# Patient Record
Sex: FEMALE | Race: WHITE | NOT HISPANIC OR LATINO
[De-identification: names, ages, dates, MRNs, and addresses within clinical notes are randomized per-mention and may not be internally consistent; named-entity substitution may affect disease eponyms.]

---

## 2018-08-08 ENCOUNTER — APPOINTMENT (OUTPATIENT)
Dept: VASCULAR SURGERY | Facility: CLINIC | Age: 63
End: 2018-08-08
Payer: MEDICARE

## 2018-08-08 ENCOUNTER — OUTPATIENT (OUTPATIENT)
Dept: OUTPATIENT SERVICES | Facility: HOSPITAL | Age: 63
LOS: 1 days | End: 2018-08-08
Payer: MEDICARE

## 2018-08-08 VITALS
HEART RATE: 75 BPM | SYSTOLIC BLOOD PRESSURE: 108 MMHG | BODY MASS INDEX: 20.58 KG/M2 | WEIGHT: 109 LBS | HEIGHT: 61 IN | DIASTOLIC BLOOD PRESSURE: 73 MMHG | OXYGEN SATURATION: 97 %

## 2018-08-08 DIAGNOSIS — R01.1 CARDIAC MURMUR, UNSPECIFIED: ICD-10-CM

## 2018-08-08 PROCEDURE — 93306 TTE W/DOPPLER COMPLETE: CPT

## 2018-08-08 PROCEDURE — 99204 OFFICE O/P NEW MOD 45 MIN: CPT

## 2018-08-08 PROCEDURE — 93306 TTE W/DOPPLER COMPLETE: CPT | Mod: 26

## 2020-11-29 ENCOUNTER — EMERGENCY (EMERGENCY)
Facility: HOSPITAL | Age: 65
LOS: 1 days | Discharge: HOME | End: 2020-11-29
Admitting: EMERGENCY MEDICINE
Payer: MEDICARE

## 2020-11-29 VITALS
DIASTOLIC BLOOD PRESSURE: 80 MMHG | HEART RATE: 64 BPM | OXYGEN SATURATION: 99 % | RESPIRATION RATE: 16 BRPM | SYSTOLIC BLOOD PRESSURE: 133 MMHG | TEMPERATURE: 98 F

## 2020-11-29 VITALS
HEIGHT: 61 IN | RESPIRATION RATE: 18 BRPM | SYSTOLIC BLOOD PRESSURE: 126 MMHG | HEART RATE: 102 BPM | WEIGHT: 106.04 LBS | OXYGEN SATURATION: 98 % | DIASTOLIC BLOOD PRESSURE: 87 MMHG | TEMPERATURE: 98 F

## 2020-11-29 DIAGNOSIS — M79.631 PAIN IN RIGHT FOREARM: ICD-10-CM

## 2020-11-29 DIAGNOSIS — M79.89 OTHER SPECIFIED SOFT TISSUE DISORDERS: ICD-10-CM

## 2020-11-29 LAB
ALBUMIN SERPL ELPH-MCNC: 4.4 G/DL — SIGNIFICANT CHANGE UP (ref 3.3–5)
ALP SERPL-CCNC: 55 U/L — SIGNIFICANT CHANGE UP (ref 40–120)
ALT FLD-CCNC: 12 U/L — SIGNIFICANT CHANGE UP (ref 10–45)
ANION GAP SERPL CALC-SCNC: 11 MMOL/L — SIGNIFICANT CHANGE UP (ref 5–17)
APTT BLD: 31.7 SEC — SIGNIFICANT CHANGE UP (ref 27.5–35.5)
AST SERPL-CCNC: 18 U/L — SIGNIFICANT CHANGE UP (ref 10–40)
BASOPHILS # BLD AUTO: 0.04 K/UL — SIGNIFICANT CHANGE UP (ref 0–0.2)
BASOPHILS NFR BLD AUTO: 0.7 % — SIGNIFICANT CHANGE UP (ref 0–2)
BILIRUB SERPL-MCNC: 0.5 MG/DL — SIGNIFICANT CHANGE UP (ref 0.2–1.2)
BUN SERPL-MCNC: 24 MG/DL — HIGH (ref 7–23)
CALCIUM SERPL-MCNC: 9.9 MG/DL — SIGNIFICANT CHANGE UP (ref 8.4–10.5)
CHLORIDE SERPL-SCNC: 104 MMOL/L — SIGNIFICANT CHANGE UP (ref 96–108)
CO2 SERPL-SCNC: 24 MMOL/L — SIGNIFICANT CHANGE UP (ref 22–31)
CREAT SERPL-MCNC: 0.78 MG/DL — SIGNIFICANT CHANGE UP (ref 0.5–1.3)
EOSINOPHIL # BLD AUTO: 0.1 K/UL — SIGNIFICANT CHANGE UP (ref 0–0.5)
EOSINOPHIL NFR BLD AUTO: 1.7 % — SIGNIFICANT CHANGE UP (ref 0–6)
GLUCOSE SERPL-MCNC: 104 MG/DL — HIGH (ref 70–99)
HCT VFR BLD CALC: 41.7 % — SIGNIFICANT CHANGE UP (ref 34.5–45)
HGB BLD-MCNC: 14.6 G/DL — SIGNIFICANT CHANGE UP (ref 11.5–15.5)
IMM GRANULOCYTES NFR BLD AUTO: 0.2 % — SIGNIFICANT CHANGE UP (ref 0–1.5)
INR BLD: 1.01 — SIGNIFICANT CHANGE UP (ref 0.88–1.16)
LYMPHOCYTES # BLD AUTO: 1.54 K/UL — SIGNIFICANT CHANGE UP (ref 1–3.3)
LYMPHOCYTES # BLD AUTO: 26.6 % — SIGNIFICANT CHANGE UP (ref 13–44)
MCHC RBC-ENTMCNC: 31.9 PG — SIGNIFICANT CHANGE UP (ref 27–34)
MCHC RBC-ENTMCNC: 35 GM/DL — SIGNIFICANT CHANGE UP (ref 32–36)
MCV RBC AUTO: 91 FL — SIGNIFICANT CHANGE UP (ref 80–100)
MONOCYTES # BLD AUTO: 0.47 K/UL — SIGNIFICANT CHANGE UP (ref 0–0.9)
MONOCYTES NFR BLD AUTO: 8.1 % — SIGNIFICANT CHANGE UP (ref 2–14)
NEUTROPHILS # BLD AUTO: 3.62 K/UL — SIGNIFICANT CHANGE UP (ref 1.8–7.4)
NEUTROPHILS NFR BLD AUTO: 62.7 % — SIGNIFICANT CHANGE UP (ref 43–77)
NRBC # BLD: 0 /100 WBCS — SIGNIFICANT CHANGE UP (ref 0–0)
PLATELET # BLD AUTO: 212 K/UL — SIGNIFICANT CHANGE UP (ref 150–400)
POTASSIUM SERPL-MCNC: 4.1 MMOL/L — SIGNIFICANT CHANGE UP (ref 3.5–5.3)
POTASSIUM SERPL-SCNC: 4.1 MMOL/L — SIGNIFICANT CHANGE UP (ref 3.5–5.3)
PROT SERPL-MCNC: 7.3 G/DL — SIGNIFICANT CHANGE UP (ref 6–8.3)
PROTHROM AB SERPL-ACNC: 12.1 SEC — SIGNIFICANT CHANGE UP (ref 10.6–13.6)
RBC # BLD: 4.58 M/UL — SIGNIFICANT CHANGE UP (ref 3.8–5.2)
RBC # FLD: 11.7 % — SIGNIFICANT CHANGE UP (ref 10.3–14.5)
SODIUM SERPL-SCNC: 139 MMOL/L — SIGNIFICANT CHANGE UP (ref 135–145)
WBC # BLD: 5.78 K/UL — SIGNIFICANT CHANGE UP (ref 3.8–10.5)
WBC # FLD AUTO: 5.78 K/UL — SIGNIFICANT CHANGE UP (ref 3.8–10.5)

## 2020-11-29 PROCEDURE — 85730 THROMBOPLASTIN TIME PARTIAL: CPT

## 2020-11-29 PROCEDURE — 36415 COLL VENOUS BLD VENIPUNCTURE: CPT

## 2020-11-29 PROCEDURE — 99284 EMERGENCY DEPT VISIT MOD MDM: CPT

## 2020-11-29 PROCEDURE — 80053 COMPREHEN METABOLIC PANEL: CPT

## 2020-11-29 PROCEDURE — 85025 COMPLETE CBC W/AUTO DIFF WBC: CPT

## 2020-11-29 PROCEDURE — 93971 EXTREMITY STUDY: CPT | Mod: 26,RT

## 2020-11-29 PROCEDURE — 99284 EMERGENCY DEPT VISIT MOD MDM: CPT | Mod: 25

## 2020-11-29 PROCEDURE — 93971 EXTREMITY STUDY: CPT

## 2020-11-29 PROCEDURE — 85610 PROTHROMBIN TIME: CPT

## 2020-11-29 RX ORDER — CEPHALEXIN 500 MG
500 CAPSULE ORAL ONCE
Refills: 0 | Status: COMPLETED | OUTPATIENT
Start: 2020-11-29 | End: 2020-11-29

## 2020-11-29 RX ADMIN — Medication 500 MILLIGRAM(S): at 15:26

## 2020-11-29 RX ADMIN — Medication 1 TABLET(S): at 15:26

## 2020-11-29 NOTE — ED ADULT NURSE NOTE - OBJECTIVE STATEMENT
64 y/o female c/o right arm, hand and wrist swelling and pain since last night. pt states " I don't have lymph nodes in my arm and I have a small paper cut, so I think I can have cellulitis" pt denies fall/ injuires, fever. able to move extremity without difficulty.

## 2020-11-29 NOTE — ED PROVIDER NOTE - CLINICAL SUMMARY MEDICAL DECISION MAKING FREE TEXT BOX
RUE swelling. neurovascular intact. a febrile. dvt vs lymph edema vs cellulitis. labs noted. us done and no dvt. will tx with keflex and bactrim and wound check in 2 days. return precautions d/w pt.

## 2020-11-29 NOTE — ED PROVIDER NOTE - PHYSICAL EXAMINATION
+ swelling to right forearm from elbow to hand. mild tendernss to ulnar aspect. no erythema. no bruising. no bony tenderness. FROM. distal NVI.

## 2020-11-29 NOTE — ED PROVIDER NOTE - OBJECTIVE STATEMENT
64 y/o female with hx of breast ca s/p masectomy and lymph node removal 1978 c/o right arm swelling x 1 day. pt notes swelling to right forearm past 24 hrs and mild pain to forearm this am. no trauma. no numbness, tingling or weakness. no fever or chills. pt denies recent compression to right arm. no further complaints. 64 y/o female with hx of breast ca s/p mastectomy and lymph node removal 1978 c/o right arm swelling x 1 day. pt notes swelling to right forearm past 24 hrs and mild pain to forearm this am. no trauma. no numbness, tingling or weakness. no fever or chills. pt denies recent compression to right arm. no further complaints.

## 2020-11-29 NOTE — ED PROVIDER NOTE - NSFOLLOWUPINSTRUCTIONS_ED_ALL_ED_FT
Follow up with your physician or the Emergency Department in 48 hours for wound check.                    Cellulitis    WHAT YOU NEED TO KNOW:    Cellulitis is a skin infection caused by bacteria. Cellulitis may go away on its own or you may need treatment. Your healthcare provider may draw a [place] around the outside edges of your cellulitis. If your cellulitis spreads, your healthcare provider will see it outside of the [place].     Cellulitis          DISCHARGE INSTRUCTIONS:    Call 911 if:   •You have sudden trouble breathing or chest pain.          Return to the emergency department if:   •Your wound gets larger and more painful.       •You feel a crackling under your skin when you touch it.      •You have purple dots or bumps on your skin, or you see bleeding under your skin.      •You have new swelling and pain in your legs.      •The red, warm, swollen area gets larger.      •You see red streaks coming from the infected area.      Contact your healthcare provider if:   •You have a fever.      •Your fever or pain does not go away or gets worse.      •The area does not get smaller after 2 days of antibiotics.      •Your skin is flaking or peeling off.      •You have questions or concerns about your condition or care.      Medicines:   •Antibiotics help treat the bacterial infection.       •NSAIDs, such as ibuprofen, help decrease swelling, pain, and fever. NSAIDs can cause stomach bleeding or kidney problems in certain people. If you take blood thinner medicine, always ask if NSAIDs are safe for you. Always read the medicine label and follow directions. Do not give these medicines to children under 6 months of age without direction from your child's healthcare provider.      •Acetaminophen decreases pain and fever. It is available without a doctor's order. Ask how much to take and how often to take it. Follow directions. Read the labels of all other medicines you are using to see if they also contain acetaminophen, or ask your doctor or pharmacist. Acetaminophen can cause liver damage if not taken correctly. Do not use more than 4 grams (4,000 milligrams) total of acetaminophen in one day.       •Take your medicine as directed. Contact your healthcare provider if you think your medicine is not helping or if you have side effects. Tell him or her if you are allergic to any medicine. Keep a list of the medicines, vitamins, and herbs you take. Include the amounts, and when and why you take them. Bring the list or the pill bottles to follow-up visits. Carry your medicine list with you in case of an emergency.      Self-care:   •Elevate the area above the level of your heart as often as you can. This will help decrease swelling and pain. Prop the area on pillows or blankets to keep it elevated comfortably.       •Clean the area daily until the wound scabs over. Gently wash the area with soap and water. Pat dry. Use dressings as directed.       •Place cool or warm, wet cloths on the area as directed. Use clean cloths and clean water. Leave it on the area until the cloth is room temperature. Pat the area dry with a clean, dry cloth. The cloths may help decrease pain.       Prevent cellulitis:   •Do not scratch bug bites or areas of injury. You increase your risk for cellulitis by scratching these areas.       •Do not share personal items, such as towels, clothing, and razors.       •Clean exercise equipment with germ-killing  before and after you use it.      •Wash your hands often. Use soap and water. Wash your hands after you use the bathroom, change a child's diapers, or sneeze. Wash your hands before you prepare or eat food. Use lotion to prevent dry, cracked skin.   Handwashing           •Wear pressure stockings as directed. You may be told to wear the stockings if you have peripheral edema. The stockings improve blood flow and decrease swelling.      •Treat athlete’s foot. This can help prevent the spread of a bacterial skin infection.      Follow up with your healthcare provider within 3 days, or as directed: Your healthcare provider will check if your cellulitis is getting better. You may need different medicine. Write down your questions so you remember to ask them during your visits.       © Copyright [employer] 2020           back to top                          © Copyright [employer] 2020

## 2020-11-30 RX ORDER — AZTREONAM 2 G
1 VIAL (EA) INJECTION
Qty: 20 | Refills: 0
Start: 2020-11-30 | End: 2020-12-09

## 2020-11-30 RX ORDER — CEPHALEXIN 500 MG
1 CAPSULE ORAL
Qty: 20 | Refills: 0
Start: 2020-11-30 | End: 2020-12-09

## 2020-12-11 PROBLEM — C50.919 MALIGNANT NEOPLASM OF UNSPECIFIED SITE OF UNSPECIFIED FEMALE BREAST: Chronic | Status: ACTIVE | Noted: 2020-11-29

## 2020-12-16 ENCOUNTER — APPOINTMENT (OUTPATIENT)
Dept: VASCULAR SURGERY | Facility: CLINIC | Age: 65
End: 2020-12-16
Payer: MEDICARE

## 2020-12-16 PROCEDURE — 99213 OFFICE O/P EST LOW 20 MIN: CPT

## 2020-12-23 NOTE — ADDENDUM
[FreeTextEntry1] : This note was written by Fadumo Romero on 12/16/2020 acting as scribe for Nehemias Avila M.D.\par \par I, Nehemias Mcdonald  have read and attest that all the information, medical decision making and discharge instructions within are true and accurate.

## 2020-12-23 NOTE — ASSESSMENT
[FreeTextEntry1] : 66 y/o F with hx of mastectomy and lymph node dissection who has had two episodes of cellulitis within the past several months and now has more persistent RUE swelling than in the past.  On exam, RUE: no palpable axillary masses, no tenderness, FROM, arm slightly swollen vs LUE. Skin intact, reviewed recent labs with no evidence of DVT.\par \par  Discussed with patient given her hx of RUE lymph node dissection,her residual R arm swelling may take up to 6 months to resolve and in some cases it may persist. She was advised on RUE compression sleeve to help reduce swelling and was reminded of the delicacy of sustaining any trauma/laceration to the RUE as she may quickly develop RUE cellulitis.  No vascular surgery intervention required for now. She will f/u with us here PRN. \par

## 2020-12-23 NOTE — PHYSICAL EXAM
[Normal Thyroid] : the thyroid was normal [Normal Breath Sounds] : Normal breath sounds [2+] : left 2+ [Ankle Swelling Bilaterally] : bilaterally  [No Rash or Lesion] : No rash or lesion [Alert] : alert [Oriented to Person] : oriented to person [Oriented to Place] : oriented to place [Oriented to Time] : oriented to time [Calm] : calm [Normal Heart Sounds] : normal heart sounds [Normal Rate and Rhythm] : normal rate and rhythm [JVD] : no jugular venous distention  [Carotid Bruits] : no carotid bruits [Ankle Swelling (On Exam)] : not present [Varicose Veins Of Lower Extremities] : not present [] : not present [Abdomen Tenderness] : ~T ~M No abdominal tenderness [Purpura] : no purpura  [Petechiae] : no petechiae [Skin Ulcer] : no ulcer [Skin Induration] : no induration [de-identified] : Well-appearing, NAD [de-identified] : NC/AT, anicteric [FreeTextEntry1] : RUE: no palpable axillary masses, no tenderness, FROM, arm slightly swollen vs LUE [de-identified] : FROM 5/5 x 4, no palpable cords in UEs

## 2020-12-23 NOTE — HISTORY OF PRESENT ILLNESS
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 Patient referred by Dr. Carmen Mcfarlane for evaluation. Patient reports several months ago she had  RUE cellulitis secondary to a small cut she sustained to her hand, she was seen by her PCP and was treated with Ciprofloxacin with resolution.  A few weeks ago, she was purchasing some items at [employer] and sustained a cut to her R hand with a box, she developed another RUE redness pain and swelling.She was seen at [facility] ER on 11/29 were she was found with RUE cellulitis, she had RUE venous US negative for DVT/SVT, subclavian DVT was discharged on Cipro and Bactrim. Her cellulitis resolved but notice RUE residual swelling. Furthermore, patient c/o mild RUE discomfort with repetitive arm movements most around her shoulder but denies any pain, limiting range of motion. \par

## 2021-08-30 ENCOUNTER — OUTPATIENT (OUTPATIENT)
Dept: OUTPATIENT SERVICES | Facility: HOSPITAL | Age: 66
LOS: 1 days | End: 2021-08-30
Payer: MEDICARE

## 2021-08-30 DIAGNOSIS — I05.1 RHEUMATIC MITRAL INSUFFICIENCY: ICD-10-CM

## 2021-08-30 PROCEDURE — 93306 TTE W/DOPPLER COMPLETE: CPT | Mod: 26

## 2021-08-30 PROCEDURE — 93306 TTE W/DOPPLER COMPLETE: CPT

## 2021-10-04 ENCOUNTER — NON-APPOINTMENT (OUTPATIENT)
Age: 66
End: 2021-10-04

## 2021-10-20 ENCOUNTER — APPOINTMENT (OUTPATIENT)
Dept: HEART AND VASCULAR | Facility: CLINIC | Age: 66
End: 2021-10-20
Payer: MEDICARE

## 2021-10-20 VITALS
HEART RATE: 88 BPM | BODY MASS INDEX: 20.01 KG/M2 | DIASTOLIC BLOOD PRESSURE: 70 MMHG | HEIGHT: 61 IN | WEIGHT: 106 LBS | SYSTOLIC BLOOD PRESSURE: 100 MMHG | OXYGEN SATURATION: 95 %

## 2021-10-20 DIAGNOSIS — Z80.52 FAMILY HISTORY OF MALIGNANT NEOPLASM OF BLADDER: ICD-10-CM

## 2021-10-20 DIAGNOSIS — Z82.49 FAMILY HISTORY OF ISCHEMIC HEART DISEASE AND OTHER DISEASES OF THE CIRCULATORY SYSTEM: ICD-10-CM

## 2021-10-20 PROCEDURE — 99203 OFFICE O/P NEW LOW 30 MIN: CPT

## 2021-10-26 PROBLEM — Z80.52 FAMILY HISTORY OF MALIGNANT NEOPLASM OF URINARY BLADDER: Status: ACTIVE | Noted: 2021-10-26

## 2021-10-26 PROBLEM — Z82.49 FAMILY HISTORY OF PREMATURE CAD: Status: ACTIVE | Noted: 2021-10-26

## 2021-10-26 NOTE — ASSESSMENT
[FreeTextEntry1] : Chronic MVP   with moderate to severe MR  and dilated left atrium\par \par hyperlipidemia \par \par family hx of AAA rupture

## 2021-10-26 NOTE — REVIEW OF SYSTEMS
[Weight Loss (___ Lbs)] : [unfilled] ~Ulb weight loss [Negative] : Heme/Lymph [FreeTextEntry1] : chronic mild right arm lymphedema

## 2021-10-26 NOTE — REASON FOR VISIT
[Symptom and Test Evaluation] : symptom and test evaluation [Structural Heart and Valve Disease] : structural heart and valve disease [FreeTextEntry1] : 66 year old female presents to establish care on advice of her friend who is also my patient .  Her previous cardiologist relocated.\par \par \par Hx of hyperlipidemia and remote hx of right sided breast cancer  treated with mastectomy at age 23 (no chemo or radiation received) \par

## 2021-10-26 NOTE — DISCUSSION/SUMMARY
[Moderate Mitral Regurgitation] : moderate mitral regurgitation [Severe Mitral Regurgitation] : severe mitral regurgitation [Compensated] : compensated [BNP] : B-type natriuretic peptide [Stress Echocardiogram] : stress echocardiogram [With Me] : with me [___ Month(s)] : in [unfilled] month(s) [FreeTextEntry1] : will set up stress echo to assess objective exercise capacity  r/o exertional arrhythmias or post exercise pulmonary hypertension \par \par upon return,  will screen   for AAA \par \par will discuss coronary calcium screening  in follow up \par \par reviewed outside echo studies and labs

## 2021-10-26 NOTE — HISTORY OF PRESENT ILLNESS
[FreeTextEntry1] : Long hx of MVP , she is an ex smoker . \par \par Has hyperlipidemia  which is being treated. \par \par No hx of HTN, DM, CHF , strokes or TIAs\par \par Denies palpitations\par \par Received HD fluzone  10 days ago\par \par Completed Moderna covid vaccine series \par \par Echocardiogram performed 8/30/21 showed severe mitral prolapse of PL  with moderate to severe MR  , moderately dilated left atrium  with normal LVEF  and no pulmonary hypertension \par \par Recent labs are normal except for   elevated LDL

## 2021-10-26 NOTE — PHYSICAL EXAM
[Well Developed] : well developed [Well Nourished] : well nourished [No Acute Distress] : no acute distress [Normal Conjunctiva] : normal conjunctiva [No Xanthelasma] : no xanthelasma [Normal Venous Pressure] : normal venous pressure [No Carotid Bruit] : no carotid bruit [Normal S1, S2] : normal S1, S2 [No Rub] : no rub [No Gallop] : no gallop [Clear Lung Fields] : clear lung fields [Good Air Entry] : good air entry [No Respiratory Distress] : no respiratory distress  [Soft] : abdomen soft [Non Tender] : non-tender [No Masses/organomegaly] : no masses/organomegaly [Normal Bowel Sounds] : normal bowel sounds [Normal Gait] : normal gait [Gait - Sufficient for Exercise Testing] : gait - sufficient for exercise testing [No Edema] : no edema [No Cyanosis] : no cyanosis [No Clubbing] : no clubbing [No Varicosities] : no varicosities [No Rash] : no rash [No Skin Lesions] : no skin lesions [Moves all extremities] : moves all extremities [No Focal Deficits] : no focal deficits [Normal Speech] : normal speech [Alert and Oriented] : alert and oriented [Normal memory] : normal memory [de-identified] : anicteric  [de-identified] : MV  click and  late systolic murmur  apex

## 2021-11-18 ENCOUNTER — APPOINTMENT (OUTPATIENT)
Dept: HEART AND VASCULAR | Facility: CLINIC | Age: 66
End: 2021-11-18
Payer: MEDICARE

## 2021-11-18 VITALS
WEIGHT: 106 LBS | SYSTOLIC BLOOD PRESSURE: 114 MMHG | TEMPERATURE: 98 F | DIASTOLIC BLOOD PRESSURE: 70 MMHG | HEART RATE: 81 BPM | BODY MASS INDEX: 20.01 KG/M2 | HEIGHT: 61 IN | OXYGEN SATURATION: 84 %

## 2021-11-18 PROCEDURE — 93978 VASCULAR STUDY: CPT

## 2021-11-18 PROCEDURE — 93015 CV STRESS TEST SUPVJ I&R: CPT

## 2022-02-02 ENCOUNTER — APPOINTMENT (OUTPATIENT)
Dept: VASCULAR SURGERY | Facility: CLINIC | Age: 67
End: 2022-02-02
Payer: MEDICARE

## 2022-02-02 ENCOUNTER — RESULT CHARGE (OUTPATIENT)
Age: 67
End: 2022-02-02

## 2022-02-02 VITALS
SYSTOLIC BLOOD PRESSURE: 105 MMHG | WEIGHT: 106 LBS | DIASTOLIC BLOOD PRESSURE: 68 MMHG | HEART RATE: 72 BPM | HEIGHT: 61 IN | BODY MASS INDEX: 20.01 KG/M2

## 2022-02-02 PROCEDURE — 99213 OFFICE O/P EST LOW 20 MIN: CPT

## 2022-02-03 NOTE — ADDENDUM
[FreeTextEntry1] : I, Dr. Nehemias Greene, personally performed the evaluation and management (E/M) services for this established patient who presents today with (a) new problem(s)/exacerbation of (an) existing condition(s).  That E/M includes conducting the examination, assessing all new/exacerbated conditions, and establishing a new plan of care.  Today, my ACP, Suly Chapman NP, was here to observe my evaluation and management services for this new problem/exacerbated condition to be followed going forward.

## 2022-02-03 NOTE — ASSESSMENT
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  On exam, bulging, palpable, nontender, soft aneurysmal artery in the left temporal area. Discussed w/ pt options to not proceed with any surgical interventions and possibility to ligate the vessel as well. Risk, complications and alternatives were discussed, all questions were answered. Patient would like to proceed with surgery and this would be under local/iv sedation.

## 2022-02-03 NOTE — HISTORY OF PRESENT ILLNESS
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 Patient originally referred by Dr. Carmen Mcfarlane. Pt explains the vessel has been getting bigger and bigger. She is wondering if something can be done for it. \par \par Regarding her right arm, she has been wearing the sleeve we recommended and she explains everything has been stable. She has been wearing it daily and has not had any other episodes of cellulitis.

## 2022-02-03 NOTE — PHYSICAL EXAM
[Respiratory Effort] : normal respiratory effort [Normal Rate and Rhythm] : normal rate and rhythm [Ankle Swelling Bilaterally] : bilaterally  [No Rash or Lesion] : No rash or lesion [Alert] : alert [Oriented to Person] : oriented to person [Oriented to Place] : oriented to place [Oriented to Time] : oriented to time [Calm] : calm [JVD] : no jugular venous distention  [Carotid Bruits] : no carotid bruits [2+] : left 2+ [Ankle Swelling (On Exam)] : not present [Varicose Veins Of Lower Extremities] : not present [] : not present [Abdomen Tenderness] : ~T ~M No abdominal tenderness [Purpura] : no purpura  [Petechiae] : no petechiae [Skin Ulcer] : no ulcer [Skin Induration] : no induration [de-identified] : Well-appearing, NAD [de-identified] : NC/AT, anicteric, see cardiovasc section [FreeTextEntry1] : RUE compression sleeve in place. Bulging, palpable, nontender, soft aneurysmal artery in the left temporal area [de-identified] : FROM 5/5 x 4

## 2022-02-08 ENCOUNTER — NON-APPOINTMENT (OUTPATIENT)
Age: 67
End: 2022-02-08

## 2022-02-16 ENCOUNTER — OUTPATIENT (OUTPATIENT)
Dept: OUTPATIENT SERVICES | Facility: HOSPITAL | Age: 67
LOS: 1 days | End: 2022-02-16
Payer: MEDICARE

## 2022-02-16 DIAGNOSIS — Z01.818 ENCOUNTER FOR OTHER PREPROCEDURAL EXAMINATION: ICD-10-CM

## 2022-02-16 LAB
BASOPHILS # BLD AUTO: 0.04 K/UL — SIGNIFICANT CHANGE UP (ref 0–0.2)
BASOPHILS NFR BLD AUTO: 0.8 % — SIGNIFICANT CHANGE UP (ref 0–2)
EOSINOPHIL # BLD AUTO: 0.1 K/UL — SIGNIFICANT CHANGE UP (ref 0–0.5)
EOSINOPHIL NFR BLD AUTO: 1.9 % — SIGNIFICANT CHANGE UP (ref 0–6)
HCT VFR BLD CALC: 41.2 % — SIGNIFICANT CHANGE UP (ref 34.5–45)
HGB BLD-MCNC: 13.6 G/DL — SIGNIFICANT CHANGE UP (ref 11.5–15.5)
IMM GRANULOCYTES NFR BLD AUTO: 0.2 % — SIGNIFICANT CHANGE UP (ref 0–1.5)
INR BLD: 0.97 — SIGNIFICANT CHANGE UP (ref 0.88–1.16)
LYMPHOCYTES # BLD AUTO: 1.68 K/UL — SIGNIFICANT CHANGE UP (ref 1–3.3)
LYMPHOCYTES # BLD AUTO: 32.7 % — SIGNIFICANT CHANGE UP (ref 13–44)
MCHC RBC-ENTMCNC: 30.9 PG — SIGNIFICANT CHANGE UP (ref 27–34)
MCHC RBC-ENTMCNC: 33 GM/DL — SIGNIFICANT CHANGE UP (ref 32–36)
MCV RBC AUTO: 93.6 FL — SIGNIFICANT CHANGE UP (ref 80–100)
MONOCYTES # BLD AUTO: 0.46 K/UL — SIGNIFICANT CHANGE UP (ref 0–0.9)
MONOCYTES NFR BLD AUTO: 8.9 % — SIGNIFICANT CHANGE UP (ref 2–14)
NEUTROPHILS # BLD AUTO: 2.85 K/UL — SIGNIFICANT CHANGE UP (ref 1.8–7.4)
NEUTROPHILS NFR BLD AUTO: 55.5 % — SIGNIFICANT CHANGE UP (ref 43–77)
NRBC # BLD: 0 /100 WBCS — SIGNIFICANT CHANGE UP (ref 0–0)
PLATELET # BLD AUTO: 202 K/UL — SIGNIFICANT CHANGE UP (ref 150–400)
PROTHROM AB SERPL-ACNC: 11.6 SEC — SIGNIFICANT CHANGE UP (ref 10.6–13.6)
RBC # BLD: 4.4 M/UL — SIGNIFICANT CHANGE UP (ref 3.8–5.2)
RBC # FLD: 12 % — SIGNIFICANT CHANGE UP (ref 10.3–14.5)
WBC # BLD: 5.14 K/UL — SIGNIFICANT CHANGE UP (ref 3.8–10.5)
WBC # FLD AUTO: 5.14 K/UL — SIGNIFICANT CHANGE UP (ref 3.8–10.5)

## 2022-02-16 PROCEDURE — 93005 ELECTROCARDIOGRAM TRACING: CPT

## 2022-02-16 PROCEDURE — 85610 PROTHROMBIN TIME: CPT

## 2022-02-16 PROCEDURE — 93010 ELECTROCARDIOGRAM REPORT: CPT

## 2022-02-16 PROCEDURE — 85025 COMPLETE CBC W/AUTO DIFF WBC: CPT

## 2022-02-25 LAB
ANION GAP SERPL CALC-SCNC: 11 MMOL/L
BUN SERPL-MCNC: 19 MG/DL
CALCIUM SERPL-MCNC: 9.8 MG/DL
CHLORIDE SERPL-SCNC: 104 MMOL/L
CO2 SERPL-SCNC: 24 MMOL/L
CREAT SERPL-MCNC: 0.81 MG/DL
GLUCOSE SERPL-MCNC: 91 MG/DL
POTASSIUM SERPL-SCNC: 4.5 MMOL/L
SARS-COV-2 N GENE NPH QL NAA+PROBE: NOT DETECTED
SODIUM SERPL-SCNC: 139 MMOL/L

## 2022-02-25 NOTE — ASU PATIENT PROFILE, ADULT - FALL HARM RISK - UNIVERSAL INTERVENTIONS
Detail Level: Detailed
Quality 226: Preventive Care And Screening: Tobacco Use: Screening And Cessation Intervention: Patient screened for tobacco use and is an ex/non-smoker
Quality 110: Preventive Care And Screening: Influenza Immunization: Influenza Immunization not Administered due to Patient Allergy.
Bed in lowest position, wheels locked, appropriate side rails in place/Call bell, personal items and telephone in reach/Instruct patient to call for assistance before getting out of bed or chair/Non-slip footwear when patient is out of bed/[place] to call system/Physically safe environment - no spills, clutter or unnecessary equipment/Purposeful Proactive Rounding/Room/bathroom lighting operational, light cord in reach

## 2022-02-25 NOTE — ASU PATIENT PROFILE, ADULT - NSICDXPASTMEDICALHX_GEN_ALL_CORE_FT
PAST MEDICAL HISTORY:  Breast cancer right sided    History of aneurysm     MVP (mitral valve prolapse)

## 2022-02-27 ENCOUNTER — TRANSCRIPTION ENCOUNTER (OUTPATIENT)
Age: 67
End: 2022-02-27

## 2022-02-28 ENCOUNTER — APPOINTMENT (OUTPATIENT)
Dept: VASCULAR SURGERY | Facility: HOSPITAL | Age: 67
End: 2022-02-28

## 2022-02-28 ENCOUNTER — RESULT REVIEW (OUTPATIENT)
Age: 67
End: 2022-02-28

## 2022-02-28 ENCOUNTER — OUTPATIENT (OUTPATIENT)
Dept: OUTPATIENT SERVICES | Facility: HOSPITAL | Age: 67
LOS: 1 days | Discharge: HOME | End: 2022-02-28
Payer: MEDICARE

## 2022-02-28 VITALS
RESPIRATION RATE: 24 BRPM | HEART RATE: 69 BPM | SYSTOLIC BLOOD PRESSURE: 109 MMHG | OXYGEN SATURATION: 95 % | DIASTOLIC BLOOD PRESSURE: 65 MMHG

## 2022-02-28 VITALS
HEIGHT: 61 IN | WEIGHT: 105.6 LBS | HEART RATE: 67 BPM | OXYGEN SATURATION: 97 % | DIASTOLIC BLOOD PRESSURE: 73 MMHG | TEMPERATURE: 98 F | RESPIRATION RATE: 16 BRPM | SYSTOLIC BLOOD PRESSURE: 123 MMHG

## 2022-02-28 DIAGNOSIS — Z90.11 ACQUIRED ABSENCE OF RIGHT BREAST AND NIPPLE: Chronic | ICD-10-CM

## 2022-02-28 DIAGNOSIS — Z98.890 OTHER SPECIFIED POSTPROCEDURAL STATES: Chronic | ICD-10-CM

## 2022-02-28 LAB
ANION GAP SERPL CALC-SCNC: 9 MMOL/L — SIGNIFICANT CHANGE UP (ref 5–17)
APTT BLD: 32.1 SEC — SIGNIFICANT CHANGE UP (ref 27.5–35.5)
BLD GP AB SCN SERPL QL: NEGATIVE — SIGNIFICANT CHANGE UP
BUN SERPL-MCNC: 27 MG/DL — HIGH (ref 7–23)
CALCIUM SERPL-MCNC: 9.6 MG/DL — SIGNIFICANT CHANGE UP (ref 8.4–10.5)
CHLORIDE SERPL-SCNC: 100 MMOL/L — SIGNIFICANT CHANGE UP (ref 96–108)
CO2 SERPL-SCNC: 28 MMOL/L — SIGNIFICANT CHANGE UP (ref 22–31)
CREAT SERPL-MCNC: 0.85 MG/DL — SIGNIFICANT CHANGE UP (ref 0.5–1.3)
EGFR: 74 ML/MIN/1.73M2 — SIGNIFICANT CHANGE UP
GLUCOSE SERPL-MCNC: 96 MG/DL — SIGNIFICANT CHANGE UP (ref 70–99)
POTASSIUM SERPL-MCNC: 3.8 MMOL/L — SIGNIFICANT CHANGE UP (ref 3.5–5.3)
POTASSIUM SERPL-SCNC: 3.8 MMOL/L — SIGNIFICANT CHANGE UP (ref 3.5–5.3)
RH IG SCN BLD-IMP: POSITIVE — SIGNIFICANT CHANGE UP
SODIUM SERPL-SCNC: 137 MMOL/L — SIGNIFICANT CHANGE UP (ref 135–145)

## 2022-02-28 PROCEDURE — 37609 LIGATION/BX TEMPORAL ARTERY: CPT | Mod: GC,LT

## 2022-02-28 PROCEDURE — 85730 THROMBOPLASTIN TIME PARTIAL: CPT

## 2022-02-28 PROCEDURE — 88313 SPECIAL STAINS GROUP 2: CPT | Mod: 26

## 2022-02-28 PROCEDURE — 88305 TISSUE EXAM BY PATHOLOGIST: CPT | Mod: 26

## 2022-02-28 PROCEDURE — 80048 BASIC METABOLIC PNL TOTAL CA: CPT

## 2022-02-28 PROCEDURE — 86900 BLOOD TYPING SEROLOGIC ABO: CPT

## 2022-02-28 PROCEDURE — 86850 RBC ANTIBODY SCREEN: CPT

## 2022-02-28 PROCEDURE — 36415 COLL VENOUS BLD VENIPUNCTURE: CPT

## 2022-02-28 PROCEDURE — 86901 BLOOD TYPING SEROLOGIC RH(D): CPT

## 2022-02-28 PROCEDURE — 37609 LIGATION/BX TEMPORAL ARTERY: CPT

## 2022-02-28 PROCEDURE — 88305 TISSUE EXAM BY PATHOLOGIST: CPT

## 2022-02-28 PROCEDURE — 88313 SPECIAL STAINS GROUP 2: CPT

## 2022-02-28 RX ORDER — ACETAMINOPHEN 500 MG
650 TABLET ORAL EVERY 6 HOURS
Refills: 0 | Status: DISCONTINUED | OUTPATIENT
Start: 2022-02-28 | End: 2022-02-28

## 2022-02-28 RX ADMIN — Medication 650 MILLIGRAM(S): at 13:10

## 2022-02-28 RX ADMIN — Medication 650 MILLIGRAM(S): at 12:38

## 2022-02-28 NOTE — BRIEF OPERATIVE NOTE - OPERATION/FINDINGS
Patient's L temple prepped and draped in sterile fashion. Incision down to superficial temporal artery, noted dilated/tortuous cluster of vessels. Excised, artery ligated. Hemostasis achieved, closed in layers.

## 2022-02-28 NOTE — ASU DISCHARGE PLAN (ADULT/PEDIATRIC) - NS MD DC FALL RISK RISK
For information on Fall & Injury Prevention, visit: [email or web address] OR  [email or web address] OR  https://www.cdc.gov/steadi/patient.html

## 2022-02-28 NOTE — ASU DISCHARGE PLAN (ADULT/PEDIATRIC) - CARE PROVIDER_API CALL
Nehemias Greene)  Vascular Surgery  [address]  [place], [place] [address]  Phone: [phone]  Fax: [phone]  Follow Up Time:

## 2022-03-01 PROBLEM — I34.1 NONRHEUMATIC MITRAL (VALVE) PROLAPSE: Chronic | Status: ACTIVE | Noted: 2022-02-25

## 2022-03-02 LAB — SURGICAL PATHOLOGY STUDY: SIGNIFICANT CHANGE UP

## 2022-03-04 ENCOUNTER — APPOINTMENT (OUTPATIENT)
Dept: VASCULAR SURGERY | Facility: CLINIC | Age: 67
End: 2022-03-04
Payer: MEDICARE

## 2022-03-04 VITALS
SYSTOLIC BLOOD PRESSURE: 109 MMHG | BODY MASS INDEX: 20.01 KG/M2 | WEIGHT: 106 LBS | DIASTOLIC BLOOD PRESSURE: 72 MMHG | HEART RATE: 74 BPM | HEIGHT: 61 IN

## 2022-03-04 PROCEDURE — 99024 POSTOP FOLLOW-UP VISIT: CPT

## 2022-03-04 NOTE — DISCUSSION/SUMMARY
[FreeTextEntry1] : 68 y/o F L tortuous temporal artery, now s/p L temporal artery ligation on 2/28/22. On exam, incision healing well and no signs of infection. Discussed with pt to let surgical glue fall off on its own. Bruising will improve over the next few weeks. She may f/u prn.

## 2022-03-04 NOTE — REASON FOR VISIT
[de-identified] : Left temporal artery ligation [de-identified] : 2/28/22 [de-identified] : 68 y/o F former smoker with PMHx of R breast cancer s/p mastectomy and lymph node dissection (47 years ago), bilateral tortuous superficial temporal arteries L>R, now s/p L temporal artery ligation on 2/28/22. She reports feeling well since the surgery. Denies any swelling, drainage from incision, fever/chills. She just reports bruising on the area which has been already improving.\par \par Patient originally referred by Dr. Carmen Mcfarlane.

## 2022-03-04 NOTE — PHYSICAL EXAM
[Respiratory Effort] : normal respiratory effort [Normal Rate and Rhythm] : normal rate and rhythm [2+] : left 2+ [Ankle Swelling Bilaterally] : bilaterally  [No Rash or Lesion] : No rash or lesion [Alert] : alert [Oriented to Person] : oriented to person [Oriented to Place] : oriented to place [Oriented to Time] : oriented to time [Calm] : calm [JVD] : no jugular venous distention  [Carotid Bruits] : no carotid bruits [Ankle Swelling (On Exam)] : not present [Varicose Veins Of Lower Extremities] : not present [] : not present [Abdomen Tenderness] : ~T ~M No abdominal tenderness [Purpura] : no purpura  [Petechiae] : no petechiae [Skin Ulcer] : no ulcer [Skin Induration] : no induration [de-identified] : Well-appearing, NAD [de-identified] : NC/AT,  see cardiovasc section [FreeTextEntry1] : Left temporal incision healing well, surgical glue in place, no drainage, no erythema, only mild bruising which is already resolving [de-identified] : FROM 5/5 x 4

## 2022-07-18 ENCOUNTER — NON-APPOINTMENT (OUTPATIENT)
Age: 67
End: 2022-07-18

## 2022-07-21 ENCOUNTER — NON-APPOINTMENT (OUTPATIENT)
Age: 67
End: 2022-07-21

## 2022-07-21 ENCOUNTER — APPOINTMENT (OUTPATIENT)
Dept: INTERNAL MEDICINE | Facility: CLINIC | Age: 67
End: 2022-07-21

## 2022-07-21 VITALS
WEIGHT: 105 LBS | BODY MASS INDEX: 19.83 KG/M2 | HEIGHT: 61 IN | TEMPERATURE: 98.2 F | SYSTOLIC BLOOD PRESSURE: 109 MMHG | DIASTOLIC BLOOD PRESSURE: 66 MMHG | OXYGEN SATURATION: 95 % | HEART RATE: 69 BPM

## 2022-07-21 DIAGNOSIS — M16.10 UNILATERAL PRIMARY OSTEOARTHRITIS, UNSPECIFIED HIP: ICD-10-CM

## 2022-07-21 DIAGNOSIS — Z87.891 PERSONAL HISTORY OF NICOTINE DEPENDENCE: ICD-10-CM

## 2022-07-21 DIAGNOSIS — M81.0 AGE-RELATED OSTEOPOROSIS W/OUT CURRENT PATHOLOGICAL FRACTURE: ICD-10-CM

## 2022-07-21 DIAGNOSIS — E78.5 HYPERLIPIDEMIA, UNSPECIFIED: ICD-10-CM

## 2022-07-21 DIAGNOSIS — K21.9 GASTRO-ESOPHAGEAL REFLUX DISEASE W/OUT ESOPHAGITIS: ICD-10-CM

## 2022-07-21 DIAGNOSIS — R09.89 OTHER SPECIFIED SYMPTOMS AND SIGNS INVOLVING THE CIRCULATORY AND RESPIRATORY SYSTEMS: ICD-10-CM

## 2022-07-21 DIAGNOSIS — I72.8 ANEURYSM OF OTHER SPECIFIED ARTERIES: ICD-10-CM

## 2022-07-21 DIAGNOSIS — Z01.00 ENCOUNTER FOR EXAMINATION OF EYES AND VISION W/OUT ABNORMAL FINDINGS: ICD-10-CM

## 2022-07-21 PROCEDURE — G0438: CPT

## 2022-07-21 PROCEDURE — 36415 COLL VENOUS BLD VENIPUNCTURE: CPT

## 2022-07-21 PROCEDURE — 99212 OFFICE O/P EST SF 10 MIN: CPT | Mod: 25

## 2022-07-21 PROCEDURE — G0439: CPT

## 2022-07-21 PROCEDURE — 93000 ELECTROCARDIOGRAM COMPLETE: CPT

## 2022-07-21 RX ORDER — FLAXSEED OIL 1000 MG
CAPSULE ORAL
Refills: 0 | Status: COMPLETED | COMMUNITY
End: 2022-07-21

## 2022-07-21 RX ORDER — MELOXICAM 15 MG/1
15 TABLET ORAL
Qty: 30 | Refills: 0 | Status: COMPLETED | COMMUNITY
Start: 2022-05-12

## 2022-07-21 RX ORDER — CHOLECALCIFEROL (VITAMIN D3) 10 MCG
TABLET ORAL
Refills: 0 | Status: COMPLETED | COMMUNITY
End: 2022-07-21

## 2022-07-21 RX ORDER — PSYLLIUM HUSK 0.4 G
CAPSULE ORAL
Refills: 0 | Status: COMPLETED | COMMUNITY
End: 2022-07-21

## 2022-07-21 RX ORDER — CONJUGATED ESTROGENS 0.62 MG/G
0.62 CREAM VAGINAL
Refills: 0 | Status: ACTIVE | COMMUNITY

## 2022-07-21 NOTE — PHYSICAL EXAM
[No Acute Distress] : no acute distress [Well Nourished] : well nourished [Well Developed] : well developed [Well-Appearing] : well-appearing [Normal Sclera/Conjunctiva] : normal sclera/conjunctiva [PERRL] : pupils equal round and reactive to light [EOMI] : extraocular movements intact [No JVD] : no jugular venous distention [No Lymphadenopathy] : no lymphadenopathy [Supple] : supple [Thyroid Normal, No Nodules] : the thyroid was normal and there were no nodules present [No Respiratory Distress] : no respiratory distress  [No Accessory Muscle Use] : no accessory muscle use [Clear to Auscultation] : lungs were clear to auscultation bilaterally [Normal Rate] : normal rate  [Regular Rhythm] : with a regular rhythm [Normal S1, S2] : normal S1 and S2 [No Murmur] : no murmur heard [No Carotid Bruits] : no carotid bruits [No Abdominal Bruit] : a ~M bruit was not heard ~T in the abdomen [No Varicosities] : no varicosities [Pedal Pulses Present] : the pedal pulses are present [No Edema] : there was no peripheral edema [No Palpable Aorta] : no palpable aorta [No Extremity Clubbing/Cyanosis] : no extremity clubbing/cyanosis [Soft] : abdomen soft [Non Tender] : non-tender [Non-distended] : non-distended [No Masses] : no abdominal mass palpated [No HSM] : no HSM [Normal Bowel Sounds] : normal bowel sounds [Normal Posterior Cervical Nodes] : no posterior cervical lymphadenopathy [Normal Anterior Cervical Nodes] : no anterior cervical lymphadenopathy [No CVA Tenderness] : no CVA  tenderness [No Spinal Tenderness] : no spinal tenderness [No Joint Swelling] : no joint swelling [Grossly Normal Strength/Tone] : grossly normal strength/tone [No Rash] : no rash [Coordination Grossly Intact] : coordination grossly intact [No Focal Deficits] : no focal deficits [Normal Gait] : normal gait [Deep Tendon Reflexes (DTR)] : deep tendon reflexes were 2+ and symmetric [Normal Affect] : the affect was normal [Normal Insight/Judgement] : insight and judgment were intact [Normal Outer Ear/Nose] : the outer ears and nose were normal in appearance [Normal TMs] : both tympanic membranes were normal [No Hernias] : no hernias [Alert and Oriented x3] : oriented to person, place, and time [de-identified] : posterior pharynx red [de-identified] : s/p R masectomy/reconstruction

## 2022-07-21 NOTE — HEALTH RISK ASSESSMENT
[Good] : ~his/her~  mood as  good [No falls in past year] : Patient reported no falls in the past year [0] : 2) Feeling down, depressed, or hopeless: Not at all (0) [PHQ-2 Negative - No further assessment needed] : PHQ-2 Negative - No further assessment needed [Patient reported mammogram was normal] : Patient reported mammogram was normal [Patient reported bone density results were abnormal] : Patient reported bone density results were abnormal [Patient reported colonoscopy was normal] : Patient reported colonoscopy was normal [HIV test declined] : HIV test declined [Hepatitis C test declined] : Hepatitis C test declined [[id number]] : 0 [Change in mental status noted] : No change in mental status noted [Language] : denies difficulty with language [Behavior] : denies difficulty with behavior [Learning/Retaining New Information] : denies difficulty learning/retaining new information [Handling Complex Tasks] : denies difficulty handling complex tasks [Reasoning] : denies difficulty with reasoning [Spatial Ability and Orientation] : denies difficulty with spatial ability and orientation [MammogramDate] : 01/22 [PapSmearDate] : 2022 [BoneDensityDate] : 02/22 [ColonoscopyDate] : 2019

## 2022-07-21 NOTE — HISTORY OF PRESENT ILLNESS
[de-identified] : \par 68 y/o female presents to establish care and have Annual Exam.\par Referred by Jimena Cortez (current patient).\par H/o Left temporal artery aneurysm s/p ligation.\par H/o R breast cancer s/p mastectomy and lymph node dissection (age 23, no chemo/XRT).\par \par Has chronic intermittent GERD. Doesn't take anything. Wakes up many mornings with pharyngitis. Repeatedly COVID19 negative. \par \par Saw Dr Ballesteros last fall-had stress test/ECHO.\par \par Has pulmonary nodule that needs following-former smoker (40 pack years). \par \par Osteoporosis-Reclast 2/22 (#3).\par \par Chronic constipation x many years. C-scopy on schedule. Takes Miralax.\par \par COVID vaccine x 4.\par Shingrix completed.\par PNA completed.

## 2022-07-21 NOTE — REVIEW OF SYSTEMS
[Negative] : Heme/Lymph [Abdominal Pain] : no abdominal pain [Nausea] : no nausea [Constipation] : constipation [Diarrhea] : diarrhea [Vomiting] : no vomiting [Heartburn] : heartburn [Melena] : no melena

## 2022-07-21 NOTE — ASSESSMENT
[FreeTextEntry1] : 67 year is here for Medicare annual wellness exam. her cognitive function was normal. Please refer to an appropriate section above for a list of providers that are regularly involved in the patient's care. See above for full details of history and physical. The patient's care team was reviewed and documented above. Listed above are the preventative services for which the patient may be due. I informed the patient with a list and offered assistance in scheduling the appropriate exams.\par Labs sent. \par No need for ECHO at this time.\par \par CT chest ordered for f/u.\par \par Start Pepcid 20 mg nightly for GERD/pharyngeal discomfort from reflux.

## 2022-07-22 LAB
25(OH)D3 SERPL-MCNC: 50.7 NG/ML
ALBUMIN SERPL ELPH-MCNC: 4.7 G/DL
ALP BLD-CCNC: 48 U/L
ALT SERPL-CCNC: 14 U/L
ANION GAP SERPL CALC-SCNC: 13 MMOL/L
AST SERPL-CCNC: 22 U/L
BASOPHILS # BLD AUTO: 0.03 K/UL
BASOPHILS NFR BLD AUTO: 0.6 %
BILIRUB SERPL-MCNC: 0.6 MG/DL
BUN SERPL-MCNC: 15 MG/DL
CALCIUM SERPL-MCNC: 9.5 MG/DL
CHLORIDE SERPL-SCNC: 103 MMOL/L
CHOLEST SERPL-MCNC: 184 MG/DL
CO2 SERPL-SCNC: 23 MMOL/L
CREAT SERPL-MCNC: 0.82 MG/DL
EGFR: 77 ML/MIN/1.73M2
EOSINOPHIL # BLD AUTO: 0.1 K/UL
EOSINOPHIL NFR BLD AUTO: 1.9 %
ESTIMATED AVERAGE GLUCOSE: 117 MG/DL
GLUCOSE SERPL-MCNC: 91 MG/DL
HBA1C MFR BLD HPLC: 5.7 %
HCT VFR BLD CALC: 41.3 %
HDLC SERPL-MCNC: 96 MG/DL
HGB BLD-MCNC: 14.3 G/DL
IMM GRANULOCYTES NFR BLD AUTO: 0.2 %
LDLC SERPL CALC-MCNC: 80 MG/DL
LYMPHOCYTES # BLD AUTO: 1.72 K/UL
LYMPHOCYTES NFR BLD AUTO: 31.9 %
MAN DIFF?: NORMAL
MCHC RBC-ENTMCNC: 32.3 PG
MCHC RBC-ENTMCNC: 34.6 GM/DL
MCV RBC AUTO: 93.2 FL
MONOCYTES # BLD AUTO: 0.39 K/UL
MONOCYTES NFR BLD AUTO: 7.2 %
NEUTROPHILS # BLD AUTO: 3.14 K/UL
NEUTROPHILS NFR BLD AUTO: 58.2 %
NONHDLC SERPL-MCNC: 89 MG/DL
PLATELET # BLD AUTO: 210 K/UL
POTASSIUM SERPL-SCNC: 4.2 MMOL/L
PROT SERPL-MCNC: 6.9 G/DL
RBC # BLD: 4.43 M/UL
RBC # FLD: 12 %
SODIUM SERPL-SCNC: 138 MMOL/L
TRIGL SERPL-MCNC: 43 MG/DL
TSH SERPL-ACNC: 0.93 UIU/ML
VIT B12 SERPL-MCNC: 761 PG/ML
WBC # FLD AUTO: 5.39 K/UL

## 2022-10-06 ENCOUNTER — NON-APPOINTMENT (OUTPATIENT)
Age: 67
End: 2022-10-06

## 2022-12-15 ENCOUNTER — APPOINTMENT (OUTPATIENT)
Dept: HEART AND VASCULAR | Facility: CLINIC | Age: 67
End: 2022-12-15

## 2022-12-15 ENCOUNTER — NON-APPOINTMENT (OUTPATIENT)
Age: 67
End: 2022-12-15

## 2022-12-15 VITALS
HEART RATE: 74 BPM | HEIGHT: 61 IN | DIASTOLIC BLOOD PRESSURE: 77 MMHG | SYSTOLIC BLOOD PRESSURE: 108 MMHG | WEIGHT: 104 LBS | BODY MASS INDEX: 19.63 KG/M2 | OXYGEN SATURATION: 95 % | TEMPERATURE: 98.1 F

## 2022-12-15 PROCEDURE — 93000 ELECTROCARDIOGRAM COMPLETE: CPT

## 2022-12-15 PROCEDURE — 99205 OFFICE O/P NEW HI 60 MIN: CPT | Mod: 25

## 2022-12-15 RX ORDER — ASPIRIN 81 MG/1
81 TABLET, COATED ORAL DAILY
Qty: 30 | Refills: 6 | Status: ACTIVE | COMMUNITY
Start: 2022-12-15 | End: 1900-01-01

## 2022-12-15 NOTE — PHYSICAL EXAM
[Well Developed] : well developed [Well Nourished] : well nourished [No Acute Distress] : no acute distress [Normal Conjunctiva] : normal conjunctiva [Normal Venous Pressure] : normal venous pressure [No Carotid Bruit] : no carotid bruit [Normal S1, S2] : normal S1, S2 [No Rub] : no rub [No Gallop] : no gallop [Clear Lung Fields] : clear lung fields [Good Air Entry] : good air entry [No Respiratory Distress] : no respiratory distress  [Soft] : abdomen soft [Non Tender] : non-tender [No Masses/organomegaly] : no masses/organomegaly [Normal Bowel Sounds] : normal bowel sounds [Normal Gait] : normal gait [No Edema] : no edema [No Cyanosis] : no cyanosis [No Clubbing] : no clubbing [No Varicosities] : no varicosities [No Rash] : no rash [No Skin Lesions] : no skin lesions [Moves all extremities] : moves all extremities [No Focal Deficits] : no focal deficits [Normal Speech] : normal speech [Alert and Oriented] : alert and oriented [Normal memory] : normal memory [de-identified] : 4/6 hsm apex

## 2022-12-15 NOTE — HISTORY OF PRESENT ILLNESS
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, family h/o early cvd who presents for initial evaluation today.\par \par \par notes some chest tightness - in past year - nonradiating, no associated symptoms, lasts minutes, 3/10\par no sob, lh, syncope, edema, orthopnea, pnd \par \par family hx of asc ao aneurysm rupture/dissection\par \par CT chest: : 4.9 mm solid nodule RML, stable mild centrilobular emphysematous changes with upper lobe predominance, mild to mod 3 vessel cad\par ETT: : exercised 9.75 minutes to 104% mphr, no ekg changes\par Echo :  normal lv size/thickness, ef 65%, no wma, lae, mild ai, severe MVP posterior leaflet, mod to severe MR, trace tr, pasp 20, trace pr\par Abd u/s: : no AAA\par \par walks 3 miles/day\par no mental health issues\par \par on statin for past 2 years\par \par PMH/PSH:\par predm\par left temporal artery aneurysm s/p ligation \par right breast cancer s/p mastectomy/LN dissection \par gerd\par osteoporosis\par cad\par constipation\par hl\par mvp/mod to severe MR\par hl\par lymphedema arm \par pulm nodule\par acl repair \par breast mastectomy\par \par \par \par ALL:\par nkda\par \par \par MEDS:\par lipitor 10 mg qhs\par premarin vaginal cream\par zoledronic acid\par \par \par SH:\par former smoker quit , h/o 40 pack years\par rare etoh\par no drugs\par single\par live alone\par no children\par from [place]\par \par \par FH:\par mother -  aortic aneurysm, 64\par father -  59 bladder cancer, MI 45\par brother - 73's cabg, valve replacement\par twin brother - 69, alive, asc ao aneurysm

## 2022-12-15 NOTE — DISCUSSION/SUMMARY
[Patient] : the patient [EKG obtained to assist in diagnosis and management of assessed problem(s)] : EKG obtained to assist in diagnosis and management of assessed problem(s) [___ Month(s)] : in [unfilled] month(s) [FreeTextEntry1] : \par 68 y/o female with h/o left temporal artery aneurysm s/p ligation, right breast cancer s/p mastectomy/LN dissection (no chemo/xrt), gerd, cad, hl, predm, mvp/mod-sev MR, family h/o early cvd who presents for initial evaluation today.\par \par -labs 2022 reviewed\par -ekg ordered today - nsr, normal intervals, no st/t changes\par -MARCIO ordered for mvp/mr\par -continue statin - increase to 20 mg qhs\par -order CTA cor given cad\par -add asa 81 mg qd\par -CT chest: 2022: 4.9 mm solid nodule RML, stable mild centrilobular emphysematous changes with upper lobe predominance, mild to mod 3 vessel cad\par -ETT: 2021: exercised 9.75 minutes to 104% mphr, no ekg changes\par -Echo 2021:  normal lv size/thickness, ef 65%, no wma, lae, mild ai, severe MVP posterior leaflet, mod to severe MR, trace tr, pasp 20, trace pr\par -Abd u/s: 11/21: no AAA\par -counseled on cvd risk factors\par -f/up 3 months of cad\par \par I have spent 60 minutes reviewing labs, records, tests and discussing cad, cvd risk factors, mvp/mr

## 2022-12-27 ENCOUNTER — LABORATORY RESULT (OUTPATIENT)
Age: 67
End: 2022-12-27

## 2022-12-28 ENCOUNTER — NON-APPOINTMENT (OUTPATIENT)
Age: 67
End: 2022-12-28

## 2022-12-29 ENCOUNTER — FORM ENCOUNTER (OUTPATIENT)
Age: 67
End: 2022-12-29

## 2022-12-30 ENCOUNTER — OUTPATIENT (OUTPATIENT)
Dept: OUTPATIENT SERVICES | Facility: HOSPITAL | Age: 67
LOS: 1 days | End: 2022-12-30
Payer: MEDICARE

## 2022-12-30 DIAGNOSIS — Z98.890 OTHER SPECIFIED POSTPROCEDURAL STATES: Chronic | ICD-10-CM

## 2022-12-30 DIAGNOSIS — Z90.11 ACQUIRED ABSENCE OF RIGHT BREAST AND NIPPLE: Chronic | ICD-10-CM

## 2022-12-30 DIAGNOSIS — I34.1 NONRHEUMATIC MITRAL (VALVE) PROLAPSE: ICD-10-CM

## 2022-12-30 DIAGNOSIS — I25.10 ATHEROSCLEROTIC HEART DISEASE OF NATIVE CORONARY ARTERY WITHOUT ANGINA PECTORIS: ICD-10-CM

## 2022-12-30 PROCEDURE — 93312 ECHO TRANSESOPHAGEAL: CPT | Mod: 26

## 2022-12-30 PROCEDURE — 76377 3D RENDER W/INTRP POSTPROCES: CPT | Mod: 26

## 2022-12-30 PROCEDURE — 93312 ECHO TRANSESOPHAGEAL: CPT

## 2023-01-04 ENCOUNTER — APPOINTMENT (OUTPATIENT)
Dept: CT IMAGING | Facility: HOSPITAL | Age: 68
End: 2023-01-04
Payer: MEDICARE

## 2023-01-04 ENCOUNTER — OUTPATIENT (OUTPATIENT)
Dept: OUTPATIENT SERVICES | Facility: HOSPITAL | Age: 68
LOS: 1 days | End: 2023-01-04
Payer: MEDICARE

## 2023-01-04 DIAGNOSIS — Z98.890 OTHER SPECIFIED POSTPROCEDURAL STATES: Chronic | ICD-10-CM

## 2023-01-04 DIAGNOSIS — Z90.11 ACQUIRED ABSENCE OF RIGHT BREAST AND NIPPLE: Chronic | ICD-10-CM

## 2023-01-04 LAB — POCT ISTAT CREATININE: 1 MG/DL — SIGNIFICANT CHANGE UP (ref 0.5–1.3)

## 2023-01-04 PROCEDURE — 75574 CT ANGIO HRT W/3D IMAGE: CPT

## 2023-01-04 PROCEDURE — 75574 CT ANGIO HRT W/3D IMAGE: CPT | Mod: 26,MH

## 2023-01-04 PROCEDURE — 82565 ASSAY OF CREATININE: CPT

## 2023-01-15 ENCOUNTER — NON-APPOINTMENT (OUTPATIENT)
Age: 68
End: 2023-01-15

## 2023-01-17 ENCOUNTER — OUTPATIENT (OUTPATIENT)
Dept: OUTPATIENT SERVICES | Facility: HOSPITAL | Age: 68
LOS: 1 days | End: 2023-01-17
Payer: MEDICARE

## 2023-01-17 ENCOUNTER — APPOINTMENT (OUTPATIENT)
Dept: CARDIOTHORACIC SURGERY | Facility: CLINIC | Age: 68
End: 2023-01-17
Payer: MEDICARE

## 2023-01-17 VITALS
DIASTOLIC BLOOD PRESSURE: 75 MMHG | TEMPERATURE: 97.6 F | WEIGHT: 102 LBS | OXYGEN SATURATION: 96 % | SYSTOLIC BLOOD PRESSURE: 110 MMHG | HEART RATE: 74 BPM | HEIGHT: 61 IN | BODY MASS INDEX: 19.26 KG/M2 | RESPIRATION RATE: 16 BRPM

## 2023-01-17 DIAGNOSIS — Z98.890 OTHER SPECIFIED POSTPROCEDURAL STATES: Chronic | ICD-10-CM

## 2023-01-17 DIAGNOSIS — Z90.11 ACQUIRED ABSENCE OF RIGHT BREAST AND NIPPLE: Chronic | ICD-10-CM

## 2023-01-17 PROCEDURE — 99205 OFFICE O/P NEW HI 60 MIN: CPT

## 2023-01-17 PROCEDURE — 71046 X-RAY EXAM CHEST 2 VIEWS: CPT

## 2023-01-17 PROCEDURE — 71046 X-RAY EXAM CHEST 2 VIEWS: CPT | Mod: 26

## 2023-01-19 NOTE — ASSESSMENT
[FreeTextEntry1] : 66 y/o female with family hx of  asc ao aneurysm rupture/dissection(mother) and with PMH of  left temporal artery aneurysm s/p ligation, right breast cancer s/p mastectomy/LN dissection in 1978 (no chemo/xrt),s/p right breast implant, GERD, preDM, former smoker, RML lung nodule,and HLD and  mitral valve prolapse presents with severe mitral regurgitation.  Dr. Castano reviewed the echocardiogram images with the patient  and discussed the case with Dr. Garcia.   Dr. Castano performed the STS risk calculator and quoted a 1-2% operative mortality and complication risk and discussed the STS risk factors with the patient including but not limited to death, heart attack, bleeding, stroke, kidney problems and infection. Dr. Castano also discussed surgical approaches, minimally invasive versus sternotomy.   Dr. Castano feels the patient will benefit from and is a candidate for mitral valve repair via sternotomy. Dr. Castano discussed at length the transcatheter mitral valve clip procedure. He explained that she does not qualify for the mitral clip because she is low-intermediate surgical risk. Dr. Castano discussed enrollment the Mitral Repair Trial where she would be randomized to either surgery or mitral clip.  All questions were addressed. The patient would like to think about it and discuss with her family and obtain additional surgical consults. She will call us if she decides to proceed with surgery or enroll in the trial. \par \par Plan: \par for surgery--admit 1 day prior for [facility]\par for enrollment in mitral repair trial--schedule consult with Dr. Delgado/[facility] team\par  \par \par

## 2023-01-19 NOTE — HISTORY OF PRESENT ILLNESS
[FreeTextEntry1] : 68 y/o female with family hx of  asc ao aneurysm rupture/dissection(mother) and PMH of  left temporal artery aneurysm s/p ligation, right breast cancer s/p mastectomy/LN dissection in 1978 (no chemo/xrt),s/p right breast implant, GERD, preDM, former smoker, RML lung nodule, HLD and mitral valve prolapse presents with  severe mitral valve regurgitation. Patient seen by cardiologist, Dr. Frey with c/o chest tightness  x 1 year. She describes pain as nonradiating, no associated symptoms, lasts minutes, 3/10. She denies c/o sob/rangel, lower extremity edema, PND, orthopnea, syncope or palpitations.  Lung Cancer   Screening CT chest 8/25/22 revealed: 4.9 mm solid nodule RML, stable mild centrilobular emphysematous changes with upper lobe predominance. 12/30/22 MARCIO revealed moderate to severe MR. 1/4/23 cardiac CTA revealed non-obstructive CAD.\par \par Patient presents today for surgical consult with Dr. Eyad myers. She appears generally well, NAD. She reports going to gym 3-4x/week. She is essentially asymptomatic. NYHA 1-2.

## 2023-01-19 NOTE — DATA REVIEWED
[FreeTextEntry1] : 1/17/23 Chest xray: Heart size within normal limits, thoracic aortic calcification. Lungs and mediastinum are unremarkable. Thoracic spine degenerative changes, kypho S-shaped scoliosis\par \par 12/30/22 MARCIO:\par 1. Normal left ventricular size and systolic function.\par  2. Normal right ventricular size and systolic function.\par  3. There is mitral valve prolapse of the posterior leaflet. There is prolapse of lateral P2 with a maximum prolapse height of 10mm. There is moderate-to-severe mitral regurgitation seen at a blood pressure of 112/78 mmHg. The etiology of mitral regurgitation appears to be degenerative. There is systolic reversal of flow in the pulmonary veins consistent with severe mitral regurgitation. The Estimated Regurgitant Orifice Area (EROA) via the PISA method is 0.53 cmï¿½ (severe >0.4 cm2). The Regurgitant Volume (RV) is 71.30 ml (severe >60 ml).\par  4. There is diffuse tricuspid valve prolapse with mild regurgitation.\par \par 1/4/23 Cardiac CTA: \par Cardiac:\par 1. The calcium score is severe at 754 Agatston units, which is at the 96 percentile, adjusted for age, gender and race.\par 2. Nonobstructive coronary artery disease.\par 3. Thickening of the mitral valve leaflets with prolapse of the posterior mitral valve leaflet.\par \par Non-cardiac:\par 1. Marked multilevel degenerative disc disease with a moderate S-shaped thoracolumbar scoliosis.

## 2023-01-19 NOTE — END OF VISIT
[Time Spent: ___ minutes] : I have spent [unfilled] minutes of time on the encounter. [FreeTextEntry3] : I, STANISLAV PIERRE , am scribing for and in the presence of PINEDA DESHPANDE the following sections: History of present illness, past Medical/family/surgical/family/social history, review of systems, vital signs, physical exam and disposition.\par I personally performed the services described in the documentation, reviewed the documentation recorded by the scribe in my presence and it accurately and completely records my words and actions.\par

## 2023-01-19 NOTE — PHYSICAL EXAM
[General Appearance - Alert] : alert [General Appearance - In No Acute Distress] : in no acute distress [Sclera] : the sclera and conjunctiva were normal [PERRL With Normal Accommodation] : pupils were equal in size, round, and reactive to light [Extraocular Movements] : extraocular movements were intact [Outer Ear] : the ears and nose were normal in appearance [Oropharynx] : the oropharynx was normal [Neck Appearance] : the appearance of the neck was normal [Neck Cervical Mass (___cm)] : no neck mass was observed [Jugular Venous Distention Increased] : there was no jugular-venous distention [Thyroid Diffuse Enlargement] : the thyroid was not enlarged [Thyroid Nodule] : there were no palpable thyroid nodules [Auscultation Breath Sounds / Voice Sounds] : lungs were clear to auscultation bilaterally [Heart Rate And Rhythm] : heart rate was normal and rhythm regular [2+] : left 2+ [Bowel Sounds] : normal bowel sounds [Abdomen Soft] : soft [Abdomen Tenderness] : non-tender [Abdomen Mass (___ Cm)] : no abdominal mass palpated [No CVA Tenderness] : no ~M costovertebral angle tenderness [No Spinal Tenderness] : no spinal tenderness [Abnormal Walk] : normal gait [Musculoskeletal - Swelling] : no joint swelling seen [Skin Color & Pigmentation] : normal skin color and pigmentation [Skin Turgor] : normal skin turgor [] : no rash [Deep Tendon Reflexes (DTR)] : deep tendon reflexes were 2+ and symmetric [Sensation] : the sensory exam was normal to light touch and pinprick [No Focal Deficits] : no focal deficits [Oriented To Time, Place, And Person] : oriented to person, place, and time [Impaired Insight] : insight and judgment were intact [Affect] : the affect was normal [FreeTextEntry1] : + kyphosis

## 2023-01-24 ENCOUNTER — APPOINTMENT (OUTPATIENT)
Dept: CARDIOTHORACIC SURGERY | Facility: CLINIC | Age: 68
End: 2023-01-24
Payer: MEDICARE

## 2023-01-24 VITALS
HEIGHT: 61 IN | WEIGHT: 102 LBS | HEART RATE: 66 BPM | SYSTOLIC BLOOD PRESSURE: 109 MMHG | RESPIRATION RATE: 17 BRPM | TEMPERATURE: 96.8 F | BODY MASS INDEX: 19.26 KG/M2 | OXYGEN SATURATION: 96 % | DIASTOLIC BLOOD PRESSURE: 68 MMHG

## 2023-01-24 PROCEDURE — 99205 OFFICE O/P NEW HI 60 MIN: CPT

## 2023-01-27 NOTE — HISTORY OF PRESENT ILLNESS
[FreeTextEntry1] : 68 y/o female with FHx of asc ao aneurysm rupture/dissection(mother) and PMHx of left temporal artery aneurysm s/p ligation, right breast CA s/p mastectomy/LN dissection in 1978 (no chemo/xrt), s/p right breast implant, GERD, preDM, former smoker, RML lung nodule, HLD and mitral valve prolapse presented to outpatient cardiologist, Dr. Garcia, with c/o of CP x1yr, found to have severe MR, referred to Dr. Castano, CTSx for surgical evaluation who deemed patient low-intermediate surgical risk for MVR and now being referred to Dr. Delgado for further workup and evaluation, and possible enrollment into REPAIR MR trial to be randomized to either surgery or transcatheter mitraclip. \par \par 1/4/23 cardiac CTA revealed non-obstructive CAD.\par 12/30/22 MARCIO revealed moderate to severe MR.\par Lung Cancer Screening CT chest 8/25/22 revealed: 4.9 mm solid nodule RML, stable mild centrilobular emphysematous changes with upper lobe predominance.  \par  \par She appears generally well, NAD. She reports going to gym 3-4x/week. She is essentially asymptomatic. NYHA 1-2. She denies c/o sob/rangel, lower extremity edema, PND, orthopnea, syncope or palpitations.\par  \par

## 2023-01-27 NOTE — PHYSICAL EXAM
[Well Developed] : well developed [Well Nourished] : well nourished [Normal S1, S2] : normal S1, S2 [Normal] : alert and oriented, normal memory [de-identified] : +murmur heard at apex. +right breast mastectomy/reconstruction

## 2023-01-27 NOTE — REVIEW OF SYSTEMS
[Dyspnea on exertion] : dyspnea during exertion [Negative] : Psychiatric [Fever] : no fever [Headache] : no headache [Chills] : no chills [Feeling Fatigued] : not feeling fatigued [SOB] : no shortness of breath [Chest Discomfort] : no chest discomfort [Lower Ext Edema] : no extremity edema [Leg Claudication] : no intermittent leg claudication [Palpitations] : no palpitations [Orthopnea] : no orthopnea [PND] : no PND [Syncope] : no syncope

## 2023-02-28 ENCOUNTER — APPOINTMENT (OUTPATIENT)
Dept: INTERNAL MEDICINE | Facility: CLINIC | Age: 68
End: 2023-02-28
Payer: MEDICARE

## 2023-02-28 VITALS
WEIGHT: 94 LBS | BODY MASS INDEX: 17.75 KG/M2 | DIASTOLIC BLOOD PRESSURE: 79 MMHG | HEART RATE: 66 BPM | HEIGHT: 61 IN | SYSTOLIC BLOOD PRESSURE: 115 MMHG | OXYGEN SATURATION: 99 % | TEMPERATURE: 96.8 F

## 2023-02-28 DIAGNOSIS — I89.0 OTHER POSTPROCEDURAL ENDOCRINE AND METABOLIC COMPLICATIONS AND DISORDERS: ICD-10-CM

## 2023-02-28 DIAGNOSIS — I25.10 ATHEROSCLEROTIC HEART DISEASE OF NATIVE CORONARY ARTERY W/OUT ANGINA PECTORIS: ICD-10-CM

## 2023-02-28 DIAGNOSIS — E89.89 OTHER POSTPROCEDURAL ENDOCRINE AND METABOLIC COMPLICATIONS AND DISORDERS: ICD-10-CM

## 2023-02-28 PROCEDURE — 90471 IMMUNIZATION ADMIN: CPT

## 2023-02-28 PROCEDURE — 90715 TDAP VACCINE 7 YRS/> IM: CPT | Mod: GY

## 2023-02-28 PROCEDURE — 99214 OFFICE O/P EST MOD 30 MIN: CPT | Mod: 25

## 2023-02-28 NOTE — ASSESSMENT
[FreeTextEntry1] : see HPI.\par \par I gave her several other names of other cardioligists but urged her to speak with Dr Garcia about her concerns.\par \par She might benefit from speaking to a behavioral health specialist during this particularly stressful time. \par \par TDAP given. \par

## 2023-02-28 NOTE — HISTORY OF PRESENT ILLNESS
[de-identified] : 66 y/o female whom I met once before presents to discuss her current cardiac issues. \par Diangosed with severe MR after c/o chest tightness x 1 year. She denies c/o sob/rangel, lower extremity edema, PND, orthopnea, syncope or palpitations. Lung Cancer Screening CT chest 8/25/22 revealed: 4.9 mm solid nodule RML, stable mild centrilobular emphysematous changes with upper lobe predominance. 12/30/22 MARCIO revealed moderate to severe MR. 1/4/23 cardiac CTA revealed non-obstructive CAD. She saw Dr Garcia and is supposed to f/u with her next week-however, isn't sure she is going to continue seeing her going forward as she has many complaints about the office. \par Saw both Dr Castano and Dr Delgado. She isn't sure if she is going to have the procedure with Dr Castano either due to confusion with the timing of her last appointment.\par I expressed support for the patient regarding the stress that this upcoming surgery must be creating however, I do think her expectations of her phsycians may be unreasonable. \par I stressed that the care given by both Dr Castano and Radha is excellent. But if she goes with another cardiac surgery department, she should have a cardiologist there as well. \par Her bother 'just had open heart surgery" and she is going to ask his opinion. \par She lost ~8 pounds because she was told she was scared into thinking she was prediabetic. However, a 5.7% in a woman over 65 is totally acceptable and she should NOT lose more weight as she is now underweight. \par \par She needs a s TDAP.  \par She appears generally well, NAD. She reports going to gym 3-4x/week. She is essential\par \par H/o Left temporal artery aneurysm s/p ligation.\par H/o R breast cancer s/p mastectomy and lymph node dissection (age 23, no chemo/XRT).\par \par Has chronic intermittent GERD. Doesn't take anything. Wakes up many mornings with pharyngitis. \par \par \par Osteoporosis-Reclast 2/22 (#3).\par \par Chronic constipation x many years. C-scopy on schedule. Takes Miralax.\par \par

## 2023-02-28 NOTE — HEALTH RISK ASSESSMENT
[0] : 2) Feeling down, depressed, or hopeless: Not at all (0) [PHQ-2 Negative - No further assessment needed] : PHQ-2 Negative - No further assessment needed [Former] : Former [20 or more] : 20 or more [[id number]] : 0

## 2023-03-01 ENCOUNTER — LABORATORY RESULT (OUTPATIENT)
Age: 68
End: 2023-03-01

## 2023-03-02 ENCOUNTER — APPOINTMENT (OUTPATIENT)
Dept: HEART AND VASCULAR | Facility: CLINIC | Age: 68
End: 2023-03-02
Payer: MEDICARE

## 2023-03-02 VITALS
WEIGHT: 99 LBS | HEIGHT: 61 IN | DIASTOLIC BLOOD PRESSURE: 70 MMHG | OXYGEN SATURATION: 97 % | BODY MASS INDEX: 18.69 KG/M2 | SYSTOLIC BLOOD PRESSURE: 110 MMHG | HEART RATE: 69 BPM

## 2023-03-02 PROCEDURE — 99214 OFFICE O/P EST MOD 30 MIN: CPT | Mod: 25

## 2023-03-02 NOTE — HISTORY OF PRESENT ILLNESS
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, family h/o early cvd, cad who presents for f/up today\par \par last seen \par \par seen by Dr. Castano for CTS\par seen by 2 other surgeons for consult as well \par \par started on asa, statin\par \par -CTA : calcium score 754, 96%, mild prox LAD, minimal mid LAD, minimal D2, mild prox LCx, mild mid LCx, minimal OM2, mild prox/mid RCA, thickening of MV leaflets w prolapse of post mitral leaflet\par -labs  reviewed\par -MARCIO : normal lv size/fxn, MVP post leaflet, prolapse lateral P2 w max prolpase height 10mm, lesser degree prolapse central/medial P2, mod to severe MR, systolic flow reversal pulm veins c/w severe mr, EROA .53, RV 71, diffuse TVP w mild TR, trace ai, minimal plaque visualized portion aortic arch \par notes some chest tightness - in past year - nonradiating, no associated symptoms, lasts minutes, 310\par no sob, lh, syncope, edema, orthopnea, pnd \par \par family hx of asc ao aneurysm rupture/dissection\par \par CT chest: : 4.9 mm solid nodule RML, stable mild centrilobular emphysematous changes with upper lobe predominance, mild to mod 3 vessel cad\par ETT: : exercised 9.75 minutes to 104% mphr, no ekg changes\par Echo : normal lv size/thickness, ef 65%, no wma, lae, mild ai, severe MVP posterior leaflet, mod to severe MR, trace tr, pasp 20, trace pr\par Abd u/s: : no AAA\par \par walks 3 miles/day\par no mental health issues\par \par on statin for past 2 years\par \par PMH/PSH:\par cad\par predm\par left temporal artery aneurysm s/p ligation \par right breast cancer s/p mastectomy/LN dissection \par gerd\par osteoporosis\par cad\par constipation\par hl\par mvp/mod to severe MR\par hl\par lymphedema arm \par pulm nodule\par acl repair \par breast mastectomy\par \par \par \par ALL:\par nkda\par \par \par MEDS:\par asa 81 mg qd\par lipitor 20 mg qhs\par premarin vaginal cream\par zoledronic acid\par \par \par SH:\par former smoker quit , h/o 40 pack years\par rare etoh\par no drugs\par single\par live alone\par no children\par from [place]\par \par \par FH:\par mother -  aortic aneurysm, 64\par father -  59 bladder cancer, MI 45\par brother - 73's cabg, valve replacement\par twin brother - 69, alive, asc ao aneurysm \par

## 2023-03-02 NOTE — DISCUSSION/SUMMARY
[Patient] : the patient [___ Month(s)] : in [unfilled] month(s) 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, family h/o early cvd who presents for f/up today.\par \par -CTA 1/23: calcium score 754, 96%, mild prox LAD, minimal mid LAD, minimal D2, mild prox LCx, mild mid LCx, minimal OM2, mild prox/mid RCA, thickening of MV leaflets w prolapse of post mitral leaflet\par -labs 2022 reviewed\par -MARCIO 12/22: normal lv size/fxn, MVP post leaflet, prolapse lateral P2 w max prolpase height 10mm, lesser degree prolapse central/medial P2, mod to severe MR, systolic flow reversal pulm veins c/w severe mr, EROA .53, RV 71, diffuse TVP w mild TR, trace ai, minimal plaque visualized portion aortic arch \par -ekg 12/22 - nsr, normal intervals, no st/t changes\par -continue statin \par -continue asa\par -CT chest: 2022: 4.9 mm solid nodule RML, stable mild centrilobular emphysematous changes with upper lobe predominance, mild to mod 3 vessel cad\par -ETT: 2021: exercised 9.75 minutes to 104% mphr, no ekg changes\par -Echo 2021: normal lv size/thickness, ef 65%, no wma, lae, mild ai, severe MVP posterior leaflet, mod to severe MR, trace tr, pasp 20, trace pr\par -Abd u/s: 11/21: no AAA\par -counseled on cvd risk factors\par -f/up 3 months of cad, MR\par \par I have spent 30 minutes reviewing labs, records, tests and discussing cad, cvd risk factors, mvp/mr.

## 2023-03-03 ENCOUNTER — TRANSCRIPTION ENCOUNTER (OUTPATIENT)
Age: 68
End: 2023-03-03

## 2023-03-13 ENCOUNTER — TRANSCRIPTION ENCOUNTER (OUTPATIENT)
Age: 68
End: 2023-03-13

## 2023-03-16 ENCOUNTER — NON-APPOINTMENT (OUTPATIENT)
Age: 68
End: 2023-03-16

## 2023-03-17 ENCOUNTER — TRANSCRIPTION ENCOUNTER (OUTPATIENT)
Age: 68
End: 2023-03-17

## 2023-03-27 ENCOUNTER — TRANSCRIPTION ENCOUNTER (OUTPATIENT)
Age: 68
End: 2023-03-27

## 2023-03-28 ENCOUNTER — TRANSCRIPTION ENCOUNTER (OUTPATIENT)
Age: 68
End: 2023-03-28

## 2023-04-19 ENCOUNTER — TRANSCRIPTION ENCOUNTER (OUTPATIENT)
Age: 68
End: 2023-04-19

## 2023-04-26 ENCOUNTER — NON-APPOINTMENT (OUTPATIENT)
Age: 68
End: 2023-04-26

## 2023-05-04 ENCOUNTER — NON-APPOINTMENT (OUTPATIENT)
Age: 68
End: 2023-05-04

## 2023-05-10 ENCOUNTER — NON-APPOINTMENT (OUTPATIENT)
Age: 68
End: 2023-05-10

## 2023-05-10 ENCOUNTER — APPOINTMENT (OUTPATIENT)
Dept: HEART AND VASCULAR | Facility: CLINIC | Age: 68
End: 2023-05-10
Payer: MEDICARE

## 2023-05-10 VITALS
DIASTOLIC BLOOD PRESSURE: 67 MMHG | BODY MASS INDEX: 19.04 KG/M2 | SYSTOLIC BLOOD PRESSURE: 108 MMHG | OXYGEN SATURATION: 96 % | HEIGHT: 60 IN | HEART RATE: 70 BPM | TEMPERATURE: 97.6 F | WEIGHT: 97 LBS

## 2023-05-10 DIAGNOSIS — Z87.74 PERSONAL HISTORY OF (CORRECTED) CONGENITAL MALFORMATIONS OF HEART AND CIRCULATORY SYSTEM: ICD-10-CM

## 2023-05-10 DIAGNOSIS — Z98.890 OTHER SPECIFIED POSTPROCEDURAL STATES: ICD-10-CM

## 2023-05-10 PROCEDURE — 93000 ELECTROCARDIOGRAM COMPLETE: CPT

## 2023-05-10 PROCEDURE — 99214 OFFICE O/P EST MOD 30 MIN: CPT | Mod: 25

## 2023-05-10 PROCEDURE — 36415 COLL VENOUS BLD VENIPUNCTURE: CPT

## 2023-05-10 NOTE — HISTORY OF PRESENT ILLNESS
[FreeTextEntry1] : 67 y/o female with h/o left temporal artery aneurysm s/p ligation, right breast cancer s/p mastectomy/LN dissection (no chemo/xrt), gerd, cad, hl, predm, mvp/mod-sev MR, family h/o early cvd, cad who presents for f/up today s/p MV/TV repair, closure pfo \par \par last seen 3/23\par notes some mild rangel when starts walking\par no cp, lh, syncope, edema, orthopnea, pnd \par \par \par underwent MV repair and TV repair, pfo closure w Dr. Devine at [place] \par course c/b mild transaminitis and pericarditis - started on colchicine\par discharge echo\par Echo discharge at [place] /: normal lv size, ef 65%, trace ai, s/p mv repair, mean gradient 3, trace mr, s/p TV repair, mean gradient 2.5, trace tr\par \par changed to asa 325 mg qd\par dc'd on lipitor 20, colchicine .6 mg x 5 days, lasix 20 mg x 7 days, metoprolol 25 mg bid x 30 days, kcl 20 meq\par \par started on asa, statin\par \par -CTA : calcium score 754, 96%, mild prox LAD, minimal mid LAD, minimal D2, mild prox LCx, mild mid LCx, minimal OM2, mild prox/mid RCA, thickening of MV leaflets w prolapse of post mitral leaflet\par -labs  reviewed\par -MARCIO : normal lv size/fxn, MVP post leaflet, prolapse lateral P2 w max prolpase height 10mm, lesser degree prolapse central/medial P2, mod to severe MR, systolic flow reversal pulm veins c/w severe mr, EROA .53, RV 71, diffuse TVP w mild TR, trace ai, minimal plaque visualized portion aortic arch \par \par family hx of asc ao aneurysm rupture/dissection\par \par CT chest: : 4.9 mm solid nodule RML, stable mild centrilobular emphysematous changes with upper lobe predominance, mild to mod 3 vessel cad\par ETT: : exercised 9.75 minutes to 104% mphr, no ekg changes\par Echo : normal lv size/thickness, ef 65%, no wma, lae, mild ai, severe MVP posterior leaflet, mod to severe MR, trace tr, pasp 20, trace pr\par Abd u/s: : no AAA\par \par walks 3 miles/day\par no mental health issues\par \par on statin for past 2 years\par \par PMH/PSH:\par cad\par predm\par left temporal artery aneurysm s/p ligation \par right breast cancer s/p mastectomy/LN dissection \par gerd\par osteoporosis\par cad\par constipation\par hl\par mvp/mod to severe MR\par hl\par lymphedema arm \par pulm nodule\par acl repair \par breast mastectomy\par s/p MV/TV repair, closure pfo \par \par \par \par ALL:\par nkda\par \par \par MEDS:\par asa 325 mg qd\par lipitor 20 mg qhs\par metoprolol 25 mg bid\par premarin vaginal cream\par zoledronic acid\par \par \par SH:\par former smoker quit , h/o 40 pack years\par rare etoh\par no drugs\par single\par live alone\par no children\par from [place]\par \par \par FH:\par mother -  aortic aneurysm, 64\par father -  59 bladder cancer, MI 45\par brother - 73's cabg, valve replacement\par twin brother - 69, alive, asc ao aneurysm \par

## 2023-05-10 NOTE — DISCUSSION/SUMMARY
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, family h/o early cvd who presents for f/up today s/p MV/TV repair, pfo closure 4/23\par \par -abx in future for preprocedures given mv/tv bands\par -cardiac rehab \par -will dc metoprolol\par -s/p MV/TV repair, pfo closure 4/23 \par -ordered ekg today - nsr, normal intervals, nsra \par -Echo at [place] from discharge 4/14/23: normal lv size, ef 65%, trace ai, s/p mv repair, mean gradient 3, trace mr, s/p TV repair, mean gradient 2.5, trace tr\par -CTA 1/23: calcium score 754, 96%, mild prox LAD, minimal mid LAD, minimal D2, mild prox LCx, mild mid LCx, minimal OM2, mild prox/mid RCA, thickening of MV leaflets w prolapse of post mitral leaflet\par -labs 2023 reviewed, ordered cmp, cbc\par -MARCIO 12/22: normal lv size/fxn, MVP post leaflet, prolapse lateral P2 w max prolpase height 10mm, lesser degree prolapse central/medial P2, mod to severe MR, systolic flow reversal pulm veins c/w severe mr, EROA.53, RV 71, diffuse TVP w mild TR, trace ai, minimal plaque visualized portion aortic arch \par -ekg 12/22 - nsr, normal intervals, no st/t changes\par -continue statin \par -continue asa 325 for now - will clarify w CTS when she can transition to 81 mg daily\par -CT chest: 2022: 4.9 mm solid nodule RML, stable mild centrilobular emphysematous changes with upper lobe predominance, mild to mod 3 vessel cad\par -ETT: 2021: exercised 9.75 minutes to 104% mphr, no ekg changes\par -Echo 2021: normal lv size/thickness, ef 65%, no wma, lae, mild ai, severe MVP posterior leaflet, mod to severe MR, trace tr, pasp 20, trace pr\par -serial echo's yearly next due 4/24\par -Abd u/s: 11/21: no AAA\par -counseled on cvd risk factors\par -f/up 4-6 months of cad, MR\par \par I have spent 30 minutes reviewing labs, records, tests and discussing cad, cvd risk factors, s/p mv/tv repair

## 2023-05-11 LAB
ALBUMIN SERPL ELPH-MCNC: 4.3 G/DL
ALP BLD-CCNC: 99 U/L
ALT SERPL-CCNC: 62 U/L
ANION GAP SERPL CALC-SCNC: 10 MMOL/L
AST SERPL-CCNC: 26 U/L
BASOPHILS # BLD AUTO: 0.02 K/UL
BASOPHILS NFR BLD AUTO: 0.1 %
BILIRUB SERPL-MCNC: 0.3 MG/DL
BUN SERPL-MCNC: 23 MG/DL
CALCIUM SERPL-MCNC: 10.4 MG/DL
CHLORIDE SERPL-SCNC: 100 MMOL/L
CO2 SERPL-SCNC: 26 MMOL/L
CREAT SERPL-MCNC: 0.77 MG/DL
EGFR: 83 ML/MIN/1.73M2
EOSINOPHIL # BLD AUTO: 0.02 K/UL
EOSINOPHIL NFR BLD AUTO: 0.1 %
GLUCOSE SERPL-MCNC: 91 MG/DL
HCT VFR BLD CALC: 39.8 %
HGB BLD-MCNC: 12.5 G/DL
IMM GRANULOCYTES NFR BLD AUTO: 0.4 %
LYMPHOCYTES # BLD AUTO: 2.36 K/UL
LYMPHOCYTES NFR BLD AUTO: 17.4 %
MAN DIFF?: NORMAL
MCHC RBC-ENTMCNC: 31.1 PG
MCHC RBC-ENTMCNC: 31.4 GM/DL
MCV RBC AUTO: 99 FL
MONOCYTES # BLD AUTO: 1.34 K/UL
MONOCYTES NFR BLD AUTO: 9.9 %
NEUTROPHILS # BLD AUTO: 9.8 K/UL
NEUTROPHILS NFR BLD AUTO: 72.1 %
PLATELET # BLD AUTO: 327 K/UL
POTASSIUM SERPL-SCNC: 4.7 MMOL/L
PROT SERPL-MCNC: 6.8 G/DL
RBC # BLD: 4.02 M/UL
RBC # FLD: 12.6 %
SODIUM SERPL-SCNC: 137 MMOL/L
WBC # FLD AUTO: 13.6 K/UL

## 2023-05-15 ENCOUNTER — TRANSCRIPTION ENCOUNTER (OUTPATIENT)
Age: 68
End: 2023-05-15

## 2023-05-15 ENCOUNTER — RX RENEWAL (OUTPATIENT)
Age: 68
End: 2023-05-15

## 2023-05-15 RX ORDER — METOPROLOL SUCCINATE 25 MG/1
25 TABLET, EXTENDED RELEASE ORAL
Refills: 0 | Status: DISCONTINUED | COMMUNITY
End: 2023-05-15

## 2023-05-17 ENCOUNTER — TRANSCRIPTION ENCOUNTER (OUTPATIENT)
Age: 68
End: 2023-05-17

## 2023-05-18 ENCOUNTER — RX RENEWAL (OUTPATIENT)
Age: 68
End: 2023-05-18

## 2023-05-18 ENCOUNTER — APPOINTMENT (OUTPATIENT)
Dept: INTERNAL MEDICINE | Facility: CLINIC | Age: 68
End: 2023-05-18
Payer: MEDICARE

## 2023-05-18 VITALS
SYSTOLIC BLOOD PRESSURE: 109 MMHG | TEMPERATURE: 97.5 F | OXYGEN SATURATION: 99 % | HEIGHT: 60 IN | DIASTOLIC BLOOD PRESSURE: 78 MMHG | BODY MASS INDEX: 19.24 KG/M2 | WEIGHT: 98 LBS | HEART RATE: 78 BPM

## 2023-05-18 VITALS
HEART RATE: 78 BPM | OXYGEN SATURATION: 99 % | RESPIRATION RATE: 17 BRPM | DIASTOLIC BLOOD PRESSURE: 78 MMHG | TEMPERATURE: 97.5 F | WEIGHT: 98 LBS | BODY MASS INDEX: 19.14 KG/M2 | SYSTOLIC BLOOD PRESSURE: 109 MMHG

## 2023-05-18 PROCEDURE — 99214 OFFICE O/P EST MOD 30 MIN: CPT

## 2023-05-18 RX ORDER — CETIRIZINE HYDROCHLORIDE 10 MG/1
CAPSULE, LIQUID FILLED ORAL
Refills: 0 | Status: ACTIVE | COMMUNITY

## 2023-05-18 RX ORDER — AZELASTINE HYDROCHLORIDE 137 UG/1
0.1 SPRAY, METERED NASAL
Qty: 90 | Refills: 0 | Status: ACTIVE | COMMUNITY
Start: 2023-05-18 | End: 1900-01-01

## 2023-05-18 NOTE — HISTORY OF PRESENT ILLNESS
[FreeTextEntry8] : 69 y/o female presents for rhintiis x 4 weeks. \par Went to urgent care on May 5th due to rhintiis AND cough and was given Prednisone and Flonase. Cough improved but rhinitis persists. Added Zyrtec but still symptomatic.\par No fever. No SOB. No ear pain or pharynigits. \par \par Reviewed records from urgent care.\par \par Saw cardioligist immediately after course of steroids who did lab work and found WBC to he high . She was told to f/u with me.

## 2023-05-18 NOTE — PHYSICAL EXAM
[No Acute Distress] : no acute distress [Normal Sclera/Conjunctiva] : normal sclera/conjunctiva [Normal Outer Ear/Nose] : the outer ears and nose were normal in appearance [Normal Oropharynx] : the oropharynx was normal [Normal TMs] : both tympanic membranes were normal [No JVD] : no jugular venous distention [No Edema] : there was no peripheral edema [Normal] : affect was normal and insight and judgment were intact [de-identified] : nares edematous and erythematous with clear secretions

## 2023-05-18 NOTE — ASSESSMENT
[FreeTextEntry1] : Pt appears to have allergic rhintiis vs rhinitis due to Flonase overuse.\par D/c Flonase. Start Patanase. Start Singulair. Can continue Zyrtec.\par Will repeat CBC at [facility] in summer-likely WBC high due to course of Prednisone. \par

## 2023-05-18 NOTE — HEALTH RISK ASSESSMENT
[0] : 2) Feeling down, depressed, or hopeless: Not at all (0) [PHQ-2 Negative - No further assessment needed] : PHQ-2 Negative - No further assessment needed [[id number]] : 0 [Former] : Former [10-14] : 10-14 [> 15 Years] : > 15 Years

## 2023-05-18 NOTE — REVIEW OF SYSTEMS
[Hearing Loss] : no hearing loss [Nasal Discharge] : nasal discharge [Sore Throat] : no sore throat [Postnasal Drip] : postnasal drip [Negative] : Heme/Lymph

## 2023-05-24 ENCOUNTER — TRANSCRIPTION ENCOUNTER (OUTPATIENT)
Age: 68
End: 2023-05-24

## 2023-06-27 ENCOUNTER — NON-APPOINTMENT (OUTPATIENT)
Age: 68
End: 2023-06-27

## 2023-06-29 ENCOUNTER — TRANSCRIPTION ENCOUNTER (OUTPATIENT)
Age: 68
End: 2023-06-29

## 2023-07-05 ENCOUNTER — TRANSCRIPTION ENCOUNTER (OUTPATIENT)
Age: 68
End: 2023-07-05

## 2023-07-06 ENCOUNTER — NON-APPOINTMENT (OUTPATIENT)
Age: 68
End: 2023-07-06

## 2023-07-07 ENCOUNTER — TRANSCRIPTION ENCOUNTER (OUTPATIENT)
Age: 68
End: 2023-07-07

## 2023-07-07 ENCOUNTER — NON-APPOINTMENT (OUTPATIENT)
Age: 68
End: 2023-07-07

## 2023-07-10 ENCOUNTER — TRANSCRIPTION ENCOUNTER (OUTPATIENT)
Age: 68
End: 2023-07-10

## 2023-07-15 ENCOUNTER — NON-APPOINTMENT (OUTPATIENT)
Age: 68
End: 2023-07-15

## 2023-07-17 ENCOUNTER — EMERGENCY (EMERGENCY)
Facility: HOSPITAL | Age: 68
LOS: 1 days | Discharge: HOME | End: 2023-07-17
Attending: EMERGENCY MEDICINE | Admitting: EMERGENCY MEDICINE
Payer: MEDICARE

## 2023-07-17 ENCOUNTER — NON-APPOINTMENT (OUTPATIENT)
Age: 68
End: 2023-07-17

## 2023-07-17 ENCOUNTER — APPOINTMENT (OUTPATIENT)
Dept: HEART AND VASCULAR | Facility: CLINIC | Age: 68
End: 2023-07-17
Payer: MEDICARE

## 2023-07-17 VITALS
SYSTOLIC BLOOD PRESSURE: 111 MMHG | HEART RATE: 105 BPM | DIASTOLIC BLOOD PRESSURE: 68 MMHG | OXYGEN SATURATION: 98 % | RESPIRATION RATE: 16 BRPM

## 2023-07-17 VITALS
WEIGHT: 100.09 LBS | HEIGHT: 60 IN | SYSTOLIC BLOOD PRESSURE: 130 MMHG | HEART RATE: 119 BPM | TEMPERATURE: 98 F | DIASTOLIC BLOOD PRESSURE: 90 MMHG | RESPIRATION RATE: 14 BRPM | OXYGEN SATURATION: 97 %

## 2023-07-17 VITALS
OXYGEN SATURATION: 96 % | HEIGHT: 60 IN | DIASTOLIC BLOOD PRESSURE: 78 MMHG | HEART RATE: 111 BPM | WEIGHT: 101 LBS | SYSTOLIC BLOOD PRESSURE: 110 MMHG | TEMPERATURE: 96.6 F | BODY MASS INDEX: 19.83 KG/M2

## 2023-07-17 VITALS — HEART RATE: 110 BPM

## 2023-07-17 VITALS — HEART RATE: 113 BPM

## 2023-07-17 VITALS — HEART RATE: 109 BPM

## 2023-07-17 DIAGNOSIS — R06.02 SHORTNESS OF BREATH: ICD-10-CM

## 2023-07-17 DIAGNOSIS — Z98.890 OTHER SPECIFIED POSTPROCEDURAL STATES: ICD-10-CM

## 2023-07-17 DIAGNOSIS — Z87.891 PERSONAL HISTORY OF NICOTINE DEPENDENCE: ICD-10-CM

## 2023-07-17 DIAGNOSIS — R00.0 TACHYCARDIA, UNSPECIFIED: ICD-10-CM

## 2023-07-17 DIAGNOSIS — Z85.3 PERSONAL HISTORY OF MALIGNANT NEOPLASM OF BREAST: ICD-10-CM

## 2023-07-17 DIAGNOSIS — Z90.11 ACQUIRED ABSENCE OF RIGHT BREAST AND NIPPLE: Chronic | ICD-10-CM

## 2023-07-17 DIAGNOSIS — Z82.49 FAMILY HISTORY OF ISCHEMIC HEART DISEASE AND OTHER DISEASES OF THE CIRCULATORY SYSTEM: ICD-10-CM

## 2023-07-17 DIAGNOSIS — Z90.11 ACQUIRED ABSENCE OF RIGHT BREAST AND NIPPLE: ICD-10-CM

## 2023-07-17 DIAGNOSIS — R42 DIZZINESS AND GIDDINESS: ICD-10-CM

## 2023-07-17 DIAGNOSIS — Z86.79 PERSONAL HISTORY OF OTHER DISEASES OF THE CIRCULATORY SYSTEM: ICD-10-CM

## 2023-07-17 DIAGNOSIS — Z98.890 OTHER SPECIFIED POSTPROCEDURAL STATES: Chronic | ICD-10-CM

## 2023-07-17 DIAGNOSIS — R20.2 PARESTHESIA OF SKIN: ICD-10-CM

## 2023-07-17 DIAGNOSIS — R06.09 OTHER FORMS OF DYSPNEA: ICD-10-CM

## 2023-07-17 LAB
ANION GAP SERPL CALC-SCNC: 10 MMOL/L — SIGNIFICANT CHANGE UP (ref 5–17)
APTT BLD: 27.3 SEC — LOW (ref 27.5–35.5)
BASOPHILS # BLD AUTO: 0.03 K/UL — SIGNIFICANT CHANGE UP (ref 0–0.2)
BASOPHILS NFR BLD AUTO: 0.4 % — SIGNIFICANT CHANGE UP (ref 0–2)
BLD GP AB SCN SERPL QL: NEGATIVE — SIGNIFICANT CHANGE UP
BUN SERPL-MCNC: 19 MG/DL — SIGNIFICANT CHANGE UP (ref 7–23)
CALCIUM SERPL-MCNC: 9.6 MG/DL — SIGNIFICANT CHANGE UP (ref 8.4–10.5)
CHLORIDE SERPL-SCNC: 104 MMOL/L — SIGNIFICANT CHANGE UP (ref 96–108)
CO2 SERPL-SCNC: 25 MMOL/L — SIGNIFICANT CHANGE UP (ref 22–31)
CREAT SERPL-MCNC: 0.82 MG/DL — SIGNIFICANT CHANGE UP (ref 0.5–1.3)
EGFR: 77 ML/MIN/1.73M2 — SIGNIFICANT CHANGE UP
EOSINOPHIL # BLD AUTO: 0.1 K/UL — SIGNIFICANT CHANGE UP (ref 0–0.5)
EOSINOPHIL NFR BLD AUTO: 1.4 % — SIGNIFICANT CHANGE UP (ref 0–6)
GLUCOSE SERPL-MCNC: 101 MG/DL — HIGH (ref 70–99)
HCT VFR BLD CALC: 39.1 % — SIGNIFICANT CHANGE UP (ref 34.5–45)
HGB BLD-MCNC: 12.8 G/DL — SIGNIFICANT CHANGE UP (ref 11.5–15.5)
IMM GRANULOCYTES NFR BLD AUTO: 0.3 % — SIGNIFICANT CHANGE UP (ref 0–0.9)
INR BLD: 1.04 — SIGNIFICANT CHANGE UP (ref 0.88–1.16)
LYMPHOCYTES # BLD AUTO: 1.73 K/UL — SIGNIFICANT CHANGE UP (ref 1–3.3)
LYMPHOCYTES # BLD AUTO: 24.3 % — SIGNIFICANT CHANGE UP (ref 13–44)
MCHC RBC-ENTMCNC: 28.8 PG — SIGNIFICANT CHANGE UP (ref 27–34)
MCHC RBC-ENTMCNC: 32.7 GM/DL — SIGNIFICANT CHANGE UP (ref 32–36)
MCV RBC AUTO: 88.1 FL — SIGNIFICANT CHANGE UP (ref 80–100)
MONOCYTES # BLD AUTO: 0.56 K/UL — SIGNIFICANT CHANGE UP (ref 0–0.9)
MONOCYTES NFR BLD AUTO: 7.9 % — SIGNIFICANT CHANGE UP (ref 2–14)
NEUTROPHILS # BLD AUTO: 4.69 K/UL — SIGNIFICANT CHANGE UP (ref 1.8–7.4)
NEUTROPHILS NFR BLD AUTO: 65.7 % — SIGNIFICANT CHANGE UP (ref 43–77)
NRBC # BLD: 0 /100 WBCS — SIGNIFICANT CHANGE UP (ref 0–0)
PLATELET # BLD AUTO: 216 K/UL — SIGNIFICANT CHANGE UP (ref 150–400)
POTASSIUM SERPL-MCNC: 4.1 MMOL/L — SIGNIFICANT CHANGE UP (ref 3.5–5.3)
POTASSIUM SERPL-SCNC: 4.1 MMOL/L — SIGNIFICANT CHANGE UP (ref 3.5–5.3)
PROTHROM AB SERPL-ACNC: 12.4 SEC — SIGNIFICANT CHANGE UP (ref 10.5–13.4)
RBC # BLD: 4.44 M/UL — SIGNIFICANT CHANGE UP (ref 3.8–5.2)
RBC # FLD: 13.3 % — SIGNIFICANT CHANGE UP (ref 10.3–14.5)
RH IG SCN BLD-IMP: POSITIVE — SIGNIFICANT CHANGE UP
SODIUM SERPL-SCNC: 139 MMOL/L — SIGNIFICANT CHANGE UP (ref 135–145)
TROPONIN T, HIGH SENSITIVITY RESULT: 8 NG/L — SIGNIFICANT CHANGE UP (ref 0–51)
WBC # BLD: 7.13 K/UL — SIGNIFICANT CHANGE UP (ref 3.8–10.5)
WBC # FLD AUTO: 7.13 K/UL — SIGNIFICANT CHANGE UP (ref 3.8–10.5)

## 2023-07-17 PROCEDURE — 99285 EMERGENCY DEPT VISIT HI MDM: CPT | Mod: 25

## 2023-07-17 PROCEDURE — 93000 ELECTROCARDIOGRAM COMPLETE: CPT

## 2023-07-17 PROCEDURE — 74174 CTA ABD&PLVS W/CONTRAST: CPT | Mod: 26,MG

## 2023-07-17 PROCEDURE — 83880 ASSAY OF NATRIURETIC PEPTIDE: CPT

## 2023-07-17 PROCEDURE — G1004: CPT

## 2023-07-17 PROCEDURE — 93306 TTE W/DOPPLER COMPLETE: CPT

## 2023-07-17 PROCEDURE — 86901 BLOOD TYPING SEROLOGIC RH(D): CPT

## 2023-07-17 PROCEDURE — 93005 ELECTROCARDIOGRAM TRACING: CPT

## 2023-07-17 PROCEDURE — 85610 PROTHROMBIN TIME: CPT

## 2023-07-17 PROCEDURE — 71275 CT ANGIOGRAPHY CHEST: CPT

## 2023-07-17 PROCEDURE — 74174 CTA ABD&PLVS W/CONTRAST: CPT | Mod: MG

## 2023-07-17 PROCEDURE — 80048 BASIC METABOLIC PNL TOTAL CA: CPT

## 2023-07-17 PROCEDURE — 71275 CT ANGIOGRAPHY CHEST: CPT | Mod: 26,MH

## 2023-07-17 PROCEDURE — 85730 THROMBOPLASTIN TIME PARTIAL: CPT

## 2023-07-17 PROCEDURE — 99214 OFFICE O/P EST MOD 30 MIN: CPT | Mod: 25

## 2023-07-17 PROCEDURE — 85025 COMPLETE CBC W/AUTO DIFF WBC: CPT

## 2023-07-17 PROCEDURE — 99285 EMERGENCY DEPT VISIT HI MDM: CPT

## 2023-07-17 PROCEDURE — 84484 ASSAY OF TROPONIN QUANT: CPT

## 2023-07-17 PROCEDURE — 36415 COLL VENOUS BLD VENIPUNCTURE: CPT

## 2023-07-17 PROCEDURE — 86850 RBC ANTIBODY SCREEN: CPT

## 2023-07-17 PROCEDURE — 86900 BLOOD TYPING SEROLOGIC ABO: CPT

## 2023-07-17 RX ORDER — METOPROLOL TARTRATE 25 MG/1
25 TABLET, FILM COATED ORAL
Qty: 180 | Refills: 0 | Status: DISCONTINUED | COMMUNITY
Start: 2023-05-15 | End: 2023-07-17

## 2023-07-17 RX ORDER — METOPROLOL TARTRATE 50 MG
12.5 TABLET ORAL ONCE
Refills: 0 | Status: COMPLETED | OUTPATIENT
Start: 2023-07-17 | End: 2023-07-17

## 2023-07-17 RX ORDER — METOPROLOL TARTRATE 50 MG
25 TABLET ORAL ONCE
Refills: 0 | Status: DISCONTINUED | OUTPATIENT
Start: 2023-07-17 | End: 2023-07-17

## 2023-07-17 RX ADMIN — Medication 12.5 MILLIGRAM(S): at 22:25

## 2023-07-17 NOTE — ED PROVIDER NOTE - PHYSICAL EXAMINATION
CONST: nontoxic NAD speaking in full sentences  HEAD: atraumatic  EYES: conjunctivae clear  ENT: mmm  NECK: supple/FROM  CARD: rrr no murmurs  CHEST: ctab no r/r/w  ABD: soft, nd, nttp, no rebound/guarding  EXT: FROM, symmetric distal pulses intact  SKIN: warm, dry, no rash, cap refill <2sec  NEURO: a+ox3, 5/5 strength x4, gross sensation intact x4, baseline gait

## 2023-07-17 NOTE — ED PROVIDER NOTE - PATIENT PORTAL LINK FT
You can access the FollowMyHealth Patient Portal offered by [facility] by registering at the following website: [email or web address]. By joining [employer]’s FollowMyHealth portal, you will also be able to view your health information using other applications (apps) compatible with our system.

## 2023-07-17 NOTE — ED PROVIDER NOTE - PROGRESS NOTE DETAILS
radiology contacted for emergent read. pt taken for stat cta c/a/p r/o dissection. radiology contacted. reportedly attending currently reading images. vmail message left for dr blanton. dr blanton contacted. updated and agrees w/ plan. Rx for mtp 25qd sent to pt's pharmacy. requesting first dose now. okay for outpatient fu. pt does NOT feel comfortable taking mtp 25 at this time. agrees to 12.5 tonight given previously reportedly "dropped my heart rate too much" on 25 bid. agrees to speak w/ dr blanton in the AM re: poss adjusting Rx. dr blanton contacted. updated and agrees w/ plan. reportedly Rx for mtp 25qd sent to pt's pharmacy. requesting first dose now. okay for outpatient fu.

## 2023-07-17 NOTE — ED ADULT NURSE NOTE - NSFALLUNIVINTERV_ED_ALL_ED
Bed/Stretcher in lowest position, wheels locked, appropriate side rails in place/Call bell, personal items and telephone in reach/Instruct patient to call for assistance before getting out of bed/chair/stretcher/Non-slip footwear applied when patient is off stretcher/[place] to call system/Physically safe environment - no spills, clutter or unnecessary equipment/Purposeful proactive rounding/Room/bathroom lighting operational, light cord in reach

## 2023-07-17 NOTE — ED PROVIDER NOTE - OBJECTIVE STATEMENT
68F L temporal artery aneurysm s/p ligation (2/28/22), breast ca s/p R mastectomy/LN resection (1978), recently hospitalized (2-3mo    MR/TR ([facility], 2-3mo ago) weaned of mtp last few days  echo dilated ascending aorta 4.4 new  mom dissection, brother repari    cards: franny/gama [phone]  vasc: chris 68F former smoker (40py), L temporal artery aneurysm s/p ligation (2/28/22), breast ca s/p R mastectomy/LN resection (1978), recently hospitalized (2-3mo    MR/TR ([facility], 2-3mo ago) weaned of mtp last few days  echo dilated ascending aorta 4.4 new  mom dissection, brother repari    cards: franny/gama [phone]  vasc: chris 68F former smoker (40py), L temporal artery aneurysm s/p ligation (22), breast ca s/p R mastectomy/LN resection (), recently hospitalized (4/10/23, [facility]) for MV and TV repair uncomplicated, recently weaned off mtp (last dose 23), now referred in from cards office for r/o dissection vs pe. pt c/o progressive exertional dyspnea since surgery worse 3d ago w/ assoc positional lightheadedness/intermittent LUE paresthesia. went to [facility] who found new dilated ascending aorta 4.4cm on echo. currently feels well at rest. +++fhx aortic dissection (mother , 3 brothers w/ repairs).    cards: franny/gama [phone]  vasc: chris

## 2023-07-17 NOTE — DISCUSSION/SUMMARY
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 Stearns for asc aorta aneurysm\par -abx in future for preprocedures given mv/tv bands\par -cardiac rehab \par -will plan resume BB given ao aneurys,\par -s/p MV/TV repair, pfo closure 4/23 \par -ordered ekg today - ST, normal intervals, nsra \par -Echo at [place] from discharge 4/14/23: normal lv size, ef 65%, trace ai, s/p mv repair, mean gradient 3, trace mr, s/p TV repair, mean gradient 2.5, trace tr\par -CTA 1/23: calcium score 754, 96%, mild prox LAD, minimal mid LAD, minimal D2, mild prox LCx, mild mid LCx, minimal OM2, mild prox/mid RCA, thickening of MV leaflets w prolapse of post mitral leaflet\par -labs 2023 reviewed, labs ordered today including ddimer\par -MARCIO 12/22: normal lv size/fxn, MVP post leaflet, prolapse lateral P2 w max prolpase height 10mm, lesser degree prolapse central/medial P2, mod to severe MR, systolic flow reversal pulm veins c/w severe mr, EROA.53, RV 71, diffuse TVP w mild TR, trace ai, minimal plaque visualized portion aortic arch \par -ekg 12/22 - nsr, normal intervals, no st/t changes\par -continue statin \par -continue asa 81\par -CT chest: 2022: 4.9 mm solid nodule RML, stable mild centrilobular emphysematous changes with upper lobe predominance, mild to mod 3 vessel cad\par -ETT: 2021: exercised 9.75 minutes to 104% mphr, no ekg changes\par -Echo 2021: normal lv size/thickness, ef 65%, no wma, lae, mild ai, severe MVP posterior leaflet, mod to severe MR, trace tr, pasp 20, trace pr\par -serial echo's yearly next due 4/24\par -Abd u/s: 11/21: no AAA\par -counseled on cvd risk factors\par -f/up 1 month for dilated asc ao\par \par I have spent 30 minutes reviewing labs, records, tests and discussing cad, cvd risk factors, s/p mv/tv repair, asc ao aneurysm\par \par

## 2023-07-17 NOTE — ED ADULT NURSE REASSESSMENT NOTE - NS ED NURSE REASSESS COMMENT FT1
Received report on patient. Patient to ED s/p abnormal ECHO, pending CT results. On cardiac monitor, ambulatory with steady gait, AAOX4, NAD.

## 2023-07-17 NOTE — ED PROVIDER NOTE - NSFOLLOWUPINSTRUCTIONS_ED_ALL_ED_FT
YOUR CARDIOLOGIST, DR LA, HAS SENT A PRESCRIPTION FOR METOPROLOL 25 PER DAY TO YOUR PHARMACY. PLEASE CALL HER IN THE MORNING TO SCHEDULE YOUR OUTPATIENT FOLLOW-UP AND RE-DISCUSS YOUR DOSE AS YOU REQUESTED.    ANY IMAGING RESULTS GIVEN IN THE EMERGENCY DEPARTMENT ARE PRELIMINARY UNTIL FORMALLY READ BY A RADIOLOGIST. YOU WILL BE CONTACTED IF THERE ARE ANY SIGNIFICANT CHANGES IN THE FINAL READ.    PLEASE RETURN TO THE EMERGENCY DEPARTMENT IF DIZZINESS, FEVER, CHEST PAIN, SHORTNESS OF BREATH, ABDOMINAL PAIN, VOMITING, OTHER CONCERNING SYMPTOMS.    PLEASE CONTACT VENECIA COLEMAN ([facility] EMERGENCY DEPARTMENT CLINICAL REFERRAL COORDINATOR) TO ASSIST IN SCHEDULING YOUR FOLLOW-UP APPOINTMENT.    Monday - Friday 11am-7pm  [phone]  [email or web address]

## 2023-07-17 NOTE — ED ADULT TRIAGE NOTE - CHIEF COMPLAINT QUOTE
Pt states "I have been feeling a little short of breath and not myself lately." I Had a mitral valve repair in April and I had an ultrasound and Dr. Gomez told me I had an abnormal result with my aorta.

## 2023-07-17 NOTE — HISTORY OF PRESENT ILLNESS
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, family h/o early cvd, cad who presents for f/up today s/p MV/TV repair, closure pfo \par \par last seen \par starting  dropped metoprolol to 12.5 mg bid\par dropped asa today to 81 mg \par dc'd BB  \par no recent fever\par no new meds\par \par noted saturday rapid heart rate\par  went to /82, hr 101, oxy sat 99%\par ekg SR in 90's\par \par started cardiac rehab twice weekly - since \par no sob at [facility] on treadmill\par sob - only w positional changes, in heat\par Echo done today shows new dilated asc ao\par \par has family h/o asc ao aneurysm - mother/brother\par  \par no cp, lh, syncope, edema, orthopnea, pnd \par \par \par underwent MV repair and TV repair, pfo closure w Dr. Devine at [place] \par course c/b mild transaminitis and pericarditis - started on colchicine\par discharge echo\par Echo discharge at [place] /: normal lv size, ef 65%, trace ai, s/p mv repair, mean gradient 3, trace mr, s/p TV repair, mean gradient 2.5, trace tr\par \par changed to asa 325 mg qd\par dc'd on lipitor 20, colchicine .6 mg x 5 days, lasix 20 mg x 7 days, metoprolol 25 mg bid x 30 days, kcl 20 meq\par \par started on asa, statin\par \par -CTA : calcium score 754, 96%, mild prox LAD, minimal mid LAD, minimal D2, mild prox LCx, mild mid LCx, minimal OM2, mild prox/mid RCA, thickening of MV leaflets w prolapse of post mitral leaflet\par -labs  reviewed\par -MARCIO : normal lv size/fxn, MVP post leaflet, prolapse lateral P2 w max prolpase height 10mm, lesser degree prolapse central/medial P2, mod to severe MR, systolic flow reversal pulm veins c/w severe mr, EROA .53, RV 71, diffuse TVP w mild TR, trace ai, minimal plaque visualized portion aortic arch \par \par family hx of asc ao aneurysm rupture/dissection\par \par CT chest: : 4.9 mm solid nodule RML, stable mild centrilobular emphysematous changes with upper lobe predominance, mild to mod 3 vessel cad\par ETT: : exercised 9.75 minutes to 104% mphr, no ekg changes\par Echo : normal lv size/thickness, ef 65%, no wma, lae, mild ai, severe MVP posterior leaflet, mod to severe MR, trace tr, pasp 20, trace pr\par Abd u/s: : no AAA\par \par walks 3 miles/day\par no mental health issues\par \par on statin for past 2 years\par \par PMH/PSH:\par cad\par predm\par left temporal artery aneurysm s/p ligation \par right breast cancer s/p mastectomy/LN dissection \par gerd\par osteoporosis\par cad\par constipation\par hl\par mvp/mod to severe MR\par hl\par lymphedema arm \par pulm nodule\par acl repair \par breast mastectomy\par s/p MV/TV repair, closure pfo \par \par \par \par ALL:\par nkda\par \par \par MEDS:\par asa 81 mg qd\par lipitor 20 mg qhs\par premarin vaginal cream\par zoledronic acid\par \par \par SH:\par former smoker quit , h/o 40 pack years\par rare etoh\par no drugs\par single\par live alone\par no children\par from [place]\par \par \par FH:\par mother -  aortic aneurysm, 64\par father -  59 bladder cancer, MI 45\par brother - 73's cabg, valve replacement\par twin brother - 69, alive, asc ao aneurysm \par \par

## 2023-07-17 NOTE — ED PROVIDER NOTE - CLINICAL SUMMARY MEDICAL DECISION MAKING FREE TEXT BOX
hx obtained from pt, dr blanton and chart review. avss. nontoxic. NAD. no systemic sx. no active cp. no acute resp distress. high risk. pt taken for stat cta c/a/p r/o dissection. prior cr wnl. pt full capacity consenting to stat cta prior to cr results. cta chest w/o acute abnl. no leukocytosis vs significant anemia vs electrolyte abnl. delta hs-trop wnl. ekg nonischemic. dr blanton contacted. updated and agrees w/ plan. reportedly Rx for mtp 25qd sent to pt's pharmacy. requesting first dose now. okay for outpatient fu. pt does NOT feel comfortable taking mtp 25 at this time. agrees to 12.5 tonight given previously reportedly "dropped my heart rate too much" on 25 bid. agrees to speak w/ dr blanton in the AM re: poss adjusting Rx. poss deconditioning s/p recent cardiac surgery vs acs. no concern for pe vs aortic dissection vs aneurysm vs pna vs ptx vs pericardial effusion/tamponade at this time. will dc w/ outpatient cards fu as per reccs. strict return precautions. pt agrees w/ plan. pt aware that all imaging interpretations are based on preliminary reads at this time and later overreads may alter diagnosis/management. questions answered.

## 2023-07-17 NOTE — ED ADULT NURSE NOTE - OBJECTIVE STATEMENT
68yr/female presents to ED sent in by cardiologist for abnormal echo. Patient states "my aorta is enlarged." Patient reports SOB, reports worsened over last few weeks and reports intermittent chest pain. Denies abdominal or back pain. Ambulating with steady gait. Placed on cardiac monitor. 68yr/female presents to ED sent in by cardiologist for abnormal echo. Patient states "my aorta is enlarged." Patient reports SOB, reports worsened over last few weeks and reports palpitations and intermittent chest pain. Denies abdominal or back pain. Ambulating with steady gait. Placed on cardiac monitor.

## 2023-07-17 NOTE — ED ADULT TRIAGE NOTE - AS HEIGHT TYPE
[address] [facility] [address] [place] 
[phone] Patient: Jeaneth Chew MRN: [id number] :1957 Visit Information Date & Time Provider Department Dept. Phone Encounter #  
 2018  2:00 PM Franki Beltran MD [facility] [phone] [id number] Your Appointments 2018  9:20 AM  
ESTABLISHED PATIENT with Franki Beltran MD  
[facility] ([facility]) Appt Note: 6 month follow up  
 [address] [place] [address]  
[address] [facility] [id number] Upcoming Health Maintenance Date Due Influenza Age 5 to Adult 2018 BREAST CANCER SCRN MAMMOGRAM 2019 DTaP/Tdap/Td series (2 - Td) 2021 PAP AKA CERVICAL CYTOLOGY 2023 COLONOSCOPY 2025 Allergies as of 2018  Review Complete On: 2018 By: Radha Holden Severity Noted Reaction Type Reactions Morphine  2014    Nausea Only Zithromax [Azithromycin]  2009    Nausea Only Current Immunizations  Reviewed on 2017 Name Date TDAP Vaccine 2011, 2009 Zoster Vaccine, Live 2013 Not reviewed this visit Vitals BP Pulse Resp Height(growth percentile) Weight(growth percentile) BMI  
 134/84 (BP 1 Location: Left arm, BP Patient Position: Sitting) 66 16 5' 4\" (1.626 m) 142 lb 12.8 oz (64.8 kg) 24.51 kg/m2 OB Status Smoking Status Postmenopausal Never Smoker Vitals History BMI and BSA Data Body Mass Index Body Surface Area 24.51 kg/m 2 1.71 m 2 Preferred Pharmacy Pharmacy Name Phone [facility] [address] [phone] Your Updated Medication List  
  
   
This list is accurate as of 18  2:36 PM.  Always use your most recent med list.  
  
  
  
  
 aspirin delayed-release 81 mg tablet Take 81 mg by mouth daily. B COMPLEX 1 tablet Generic drug:  b complex vitamins Take 1 Tab by mouth daily. clobetasol 0.05 % topical cream  
Commonly known as:  TEMOVATE  
APPLY TOPICALLY TO AFFECTED AREA TWICE DAILY  
  
 doxycycline 100 mg tablet Commonly known as:  VIBRA-TABS TAKE 1 TABLET ONCE DAILY  
  
 nebivolol 10 mg tablet Commonly known as:  BYSTOLIC Take 1 Tab by mouth daily. pantoprazole 40 mg tablet Commonly known as:  PROTONIX Take 1 Tab by mouth Daily (before breakfast). VITAMIN D3 1,000 unit tablet Generic drug:  cholecalciferol Take 1,000 Units by mouth daily. Prescriptions Sent to Pharmacy Refills  
 pantoprazole (PROTONIX) 40 mg tablet 1 Sig: Take 1 Tab by mouth Daily (before breakfast). Class: Normal  
 Pharmacy: [facility] [address] [place] [address] [place] Ph #: [phone] Route: Oral  
 nebivolol (BYSTOLIC) 10 mg tablet 3 Sig: Take 1 Tab by mouth daily. Class: Normal  
 Pharmacy: [facility] [address] [place] [address] [place] Ph #: [phone] Route: Oral  
  
Patient Instructions Stop coreg Start bystolic 14WO hs 
Email me with your blood pressures in 2 weeks Introducing [facility]! Dear Eric Patel: Thank you for requesting a [employer] account. Our records indicate that you already have an active [employer] account. You can access your account anytime at [email or web address] Did you know that you can access your hospital and ER discharge instructions at any time in [employer]? You can also review all of your test results from your hospital stay or ER visit. Additional Information If you have questions, please visit the Frequently Asked Questions section of the [employer] website at [email or web address] stated Remember, MyChart is NOT to be used for urgent needs. For medical emergencies, dial 911. Now available from your iPhone and Android! Please provide this summary of care documentation to your next provider. Your primary care clinician is listed as Lilliam Govea64 If you have any questions after today's visit, please call [phone].

## 2023-07-18 ENCOUNTER — RX RENEWAL (OUTPATIENT)
Age: 68
End: 2023-07-18

## 2023-07-18 PROBLEM — Z98.890 S/P MVR (MITRAL VALVE REPAIR): Status: ACTIVE | Noted: 2023-05-10

## 2023-07-18 LAB
ALBUMIN SERPL ELPH-MCNC: 4.6 G/DL
ALP BLD-CCNC: 70 U/L
ALT SERPL-CCNC: 31 U/L
ANION GAP SERPL CALC-SCNC: 12 MMOL/L
APPEARANCE: CLEAR
AST SERPL-CCNC: 24 U/L
BACTERIA: NEGATIVE /HPF
BILIRUB SERPL-MCNC: 0.3 MG/DL
BILIRUBIN URINE: NEGATIVE
BLOOD URINE: NEGATIVE
BUN SERPL-MCNC: 21 MG/DL
CALCIUM SERPL-MCNC: 9.9 MG/DL
CAST: 0 /LPF
CHLORIDE SERPL-SCNC: 103 MMOL/L
CHOLEST SERPL-MCNC: 154 MG/DL
CO2 SERPL-SCNC: 24 MMOL/L
COLOR: YELLOW
CREAT SERPL-MCNC: 0.89 MG/DL
DEPRECATED D DIMER PPP IA-ACNC: 160 NG/ML DDU
EGFR: 70 ML/MIN/1.73M2
EPITHELIAL CELLS: 3 /HPF
ESTIMATED AVERAGE GLUCOSE: 128 MG/DL
GLUCOSE QUALITATIVE U: NEGATIVE MG/DL
GLUCOSE SERPL-MCNC: 96 MG/DL
HBA1C MFR BLD HPLC: 6.1 %
HDLC SERPL-MCNC: 93 MG/DL
KETONES URINE: NEGATIVE MG/DL
LDLC SERPL CALC-MCNC: 51 MG/DL
LEUKOCYTE ESTERASE URINE: ABNORMAL
MAGNESIUM SERPL-MCNC: 2.1 MG/DL
MICROSCOPIC-UA: NORMAL
NITRITE URINE: NEGATIVE
NONHDLC SERPL-MCNC: 61 MG/DL
PH URINE: 6.5
POTASSIUM SERPL-SCNC: 4.4 MMOL/L
PROT SERPL-MCNC: 7 G/DL
PROTEIN URINE: NEGATIVE MG/DL
RED BLOOD CELLS URINE: 0 /HPF
SODIUM SERPL-SCNC: 139 MMOL/L
SPECIFIC GRAVITY URINE: 1.01
TRIGL SERPL-MCNC: 46 MG/DL
TSH SERPL-ACNC: 0.92 UIU/ML
UROBILINOGEN URINE: 0.2 MG/DL
WHITE BLOOD CELLS URINE: 3 /HPF

## 2023-07-18 RX ORDER — METOPROLOL SUCCINATE 25 MG/1
25 TABLET, EXTENDED RELEASE ORAL DAILY
Qty: 30 | Refills: 3 | Status: DISCONTINUED | COMMUNITY
Start: 2023-07-17 | End: 2023-07-18

## 2023-07-24 ENCOUNTER — TRANSCRIPTION ENCOUNTER (OUTPATIENT)
Age: 68
End: 2023-07-24

## 2023-07-26 ENCOUNTER — TRANSCRIPTION ENCOUNTER (OUTPATIENT)
Age: 68
End: 2023-07-26

## 2023-07-31 ENCOUNTER — APPOINTMENT (OUTPATIENT)
Dept: INTERNAL MEDICINE | Facility: CLINIC | Age: 68
End: 2023-07-31
Payer: MEDICARE

## 2023-07-31 VITALS
DIASTOLIC BLOOD PRESSURE: 72 MMHG | TEMPERATURE: 98 F | OXYGEN SATURATION: 97 % | WEIGHT: 99 LBS | HEIGHT: 60 IN | SYSTOLIC BLOOD PRESSURE: 106 MMHG | HEART RATE: 108 BPM | BODY MASS INDEX: 19.44 KG/M2

## 2023-07-31 DIAGNOSIS — Z00.00 ENCOUNTER FOR GENERAL ADULT MEDICAL EXAMINATION W/OUT ABNORMAL FINDINGS: ICD-10-CM

## 2023-07-31 DIAGNOSIS — R00.0 TACHYCARDIA, UNSPECIFIED: ICD-10-CM

## 2023-07-31 DIAGNOSIS — Z86.79 PERSONAL HISTORY OF OTHER DISEASES OF THE CIRCULATORY SYSTEM: ICD-10-CM

## 2023-07-31 DIAGNOSIS — I34.0 NONRHEUMATIC MITRAL (VALVE) INSUFFICIENCY: ICD-10-CM

## 2023-07-31 PROCEDURE — G0439: CPT

## 2023-07-31 PROCEDURE — 36415 COLL VENOUS BLD VENIPUNCTURE: CPT

## 2023-07-31 PROCEDURE — 99213 OFFICE O/P EST LOW 20 MIN: CPT | Mod: 25

## 2023-07-31 RX ORDER — MONTELUKAST 10 MG/1
10 TABLET, FILM COATED ORAL DAILY
Qty: 20 | Refills: 0 | Status: COMPLETED | COMMUNITY
Start: 2023-05-18 | End: 2023-07-31

## 2023-07-31 NOTE — ASSESSMENT
[FreeTextEntry1] : 68 year is here for Medicare annual wellness exam. her cognitive function was normal. Please refer to an appropriate section above for a list of providers that are regularly involved in the patient's care. See above for full details of history and physical. The patient's care team was reviewed and documented above. Listed above are the preventative services for which the patient may be due. I informed the patient with a list and offered assistance in scheduling the appropriate exams. B12 and Vit D sent. ALl other labs done by cardioligist and reviewed. I am not concerned a bout her HgA1C. UTD with HCM. Discussed options for HR management. Pt had an expectation that post-op, she would bounce back quicker. Advised that her HR could take months to return to "normal" and that it is more dangerous that she would fall and break her hip if we lower her BP too much. Dsicssed possible Calcium channel blocker but that would have the same effect. Will discuss further with cardioligist.

## 2023-07-31 NOTE — HEALTH RISK ASSESSMENT
[Fair] : ~his/her~ current health as fair  [Good] : ~his/her~  mood as  good [No falls in past year] : Patient reported no falls in the past year [0] : 2) Feeling down, depressed, or hopeless: Not at all (0) [PHQ-2 Negative - No further assessment needed] : PHQ-2 Negative - No further assessment needed [BMV1Pldft] : 0 [Patient reported mammogram was normal] : Patient reported mammogram was normal [Change in mental status noted] : No change in mental status noted [Language] : denies difficulty with language [Behavior] : denies difficulty with behavior [Learning/Retaining New Information] : denies difficulty learning/retaining new information [Handling Complex Tasks] : denies difficulty handling complex tasks [Reasoning] : denies difficulty with reasoning [Spatial Ability and Orientation] : denies difficulty with spatial ability and orientation [None] : None [Feels Safe at Home] : Feels safe at home [Fully functional (bathing, dressing, toileting, transferring, walking, feeding)] : Fully functional (bathing, dressing, toileting, transferring, walking, feeding) [Fully functional (using the telephone, shopping, preparing meals, housekeeping, doing laundry, using] : Fully functional and needs no help or supervision to perform IADLs (using the telephone, shopping, preparing meals, housekeeping, doing laundry, using transportation, managing medications and managing finances) [Reports changes in hearing] : Reports no changes in hearing [Reports changes in vision] : Reports no changes in vision [Reports changes in dental health] : Reports no changes in dental health [Smoke Detector] : smoke detector [Safety elements used in home] : safety elements used in home [Seat Belt] :  uses seat belt [MammogramDate] : 01/23 [BoneDensityDate] : 07/22 [Former] : Former [10-14] : 10-14 [> 15 Years] : > 15 Years

## 2023-07-31 NOTE — PHYSICAL EXAM
[No Acute Distress] : no acute distress [Well Nourished] : well nourished [Well Developed] : well developed [Well-Appearing] : well-appearing [Normal Sclera/Conjunctiva] : normal sclera/conjunctiva [PERRL] : pupils equal round and reactive to light [EOMI] : extraocular movements intact [Normal Outer Ear/Nose] : the outer ears and nose were normal in appearance [Normal Oropharynx] : the oropharynx was normal [No JVD] : no jugular venous distention [No Lymphadenopathy] : no lymphadenopathy [Supple] : supple [Thyroid Normal, No Nodules] : the thyroid was normal and there were no nodules present [No Respiratory Distress] : no respiratory distress  [No Accessory Muscle Use] : no accessory muscle use [Clear to Auscultation] : lungs were clear to auscultation bilaterally [Normal Rate] : normal rate  [Regular Rhythm] : with a regular rhythm [Normal S1, S2] : normal S1 and S2 [No Murmur] : no murmur heard [No Carotid Bruits] : no carotid bruits [No Abdominal Bruit] : a ~M bruit was not heard ~T in the abdomen [No Varicosities] : no varicosities [Pedal Pulses Present] : the pedal pulses are present [No Edema] : there was no peripheral edema [No Palpable Aorta] : no palpable aorta [No Extremity Clubbing/Cyanosis] : no extremity clubbing/cyanosis [Soft] : abdomen soft [Non Tender] : non-tender [Non-distended] : non-distended [No Masses] : no abdominal mass palpated [No HSM] : no HSM [Normal Bowel Sounds] : normal bowel sounds [Normal Posterior Cervical Nodes] : no posterior cervical lymphadenopathy [Normal Anterior Cervical Nodes] : no anterior cervical lymphadenopathy [No CVA Tenderness] : no CVA  tenderness [No Spinal Tenderness] : no spinal tenderness [No Joint Swelling] : no joint swelling [Grossly Normal Strength/Tone] : grossly normal strength/tone [No Rash] : no rash [Coordination Grossly Intact] : coordination grossly intact [No Focal Deficits] : no focal deficits [Normal Gait] : normal gait [Deep Tendon Reflexes (DTR)] : deep tendon reflexes were 2+ and symmetric [Normal Affect] : the affect was normal [Alert and Oriented x3] : oriented to person, place, and time [Normal Insight/Judgement] : insight and judgment were intact [de-identified] : s/p masectomy

## 2023-07-31 NOTE — HISTORY OF PRESENT ILLNESS
[de-identified] : Here for Annual Wellness exam. Had her valve repair in April. Was placed on BB but they made her dizzy. Now off the BB, she has a "Racing heart" which "scares her". Apple watch shows HR . Saw Dr Garcia who provided reassurance. Feels "worse than before surgery". Remains concerned about HgA1C of 6.1. In a normal weight 68 year old, this is fine.  UTD with HCM.

## 2023-08-01 LAB
25(OH)D3 SERPL-MCNC: 40.5 NG/ML
IRON SATN MFR SERPL: 10 %
IRON SERPL-MCNC: 44 UG/DL
TIBC SERPL-MCNC: 443 UG/DL
UIBC SERPL-MCNC: 398 UG/DL
VIT B12 SERPL-MCNC: 589 PG/ML

## 2023-08-02 ENCOUNTER — NON-APPOINTMENT (OUTPATIENT)
Age: 68
End: 2023-08-02

## 2023-08-02 ENCOUNTER — TRANSCRIPTION ENCOUNTER (OUTPATIENT)
Age: 68
End: 2023-08-02

## 2023-08-02 ENCOUNTER — APPOINTMENT (OUTPATIENT)
Dept: CARDIOTHORACIC SURGERY | Facility: CLINIC | Age: 68
End: 2023-08-02
Payer: MEDICARE

## 2023-08-02 VITALS
DIASTOLIC BLOOD PRESSURE: 68 MMHG | WEIGHT: 99 LBS | HEIGHT: 60 IN | SYSTOLIC BLOOD PRESSURE: 108 MMHG | HEART RATE: 110 BPM | BODY MASS INDEX: 19.44 KG/M2 | RESPIRATION RATE: 16 BRPM | OXYGEN SATURATION: 96 %

## 2023-08-02 PROCEDURE — 99215 OFFICE O/P EST HI 40 MIN: CPT

## 2023-08-03 ENCOUNTER — APPOINTMENT (OUTPATIENT)
Dept: HEART AND VASCULAR | Facility: CLINIC | Age: 68
End: 2023-08-03
Payer: MEDICARE

## 2023-08-03 PROCEDURE — 99214 OFFICE O/P EST MOD 30 MIN: CPT | Mod: 95

## 2023-08-03 NOTE — HISTORY OF PRESENT ILLNESS
[FreeTextEntry1] : 67 y/o female with h/o left temporal artery aneurysm s/p ligation, right breast cancer s/p mastectomy/LN dissection (no chemo/xrt), gerd, cad, hl, predm, mvp/mod-sev MR, family h/o early cvd, cad who presents for f/up today s/p MV/TV repair, closure pfo     last seen   seen by Dr. Stearns - for aneurysm screening - referred for genetic testing notes some sob w increased exercise, walk/talk    noted to have elevatad right hemidiaphragm new on imaging referred to Dr. Porter and Dr. Jain  has been off BB 2 weeks     no cp, lh, syncope, edema, orthopnea, pnd  started cardiac rehab twice weekly - since    -Echo   CONCLUSIONS:  1. Normal left ventricular cavity size. The left ventricular wall thickness is normal. The left ventricular systolic function is normal with an ejection fraction visually estimated at 65 to 70 %. There are no regional wall motion abnormalities seen. 2. There is normal left ventricular diastolic function. 3. Normal right ventricular cavity size, normal right ventricular wall thickness and reduced systolic right ventricular function. The tricuspid annular plane systolic excursion (TAPSE) is 0.9 cm (normal >=1.7 cm). 4. Trace aortic regurgitation. 5. Dilated proximal ascending aorta. 6. No pericardial effusion seen. 7. S/P surgical mitral repair. The mean mitral gradient is 5mmHg. 8. Annuloplasty ring is noted in the tricuspid valve position. S/P surgical tricuspid repair.The mean tricuspid gradient is 9 which is elevated. No significant regurgitation. 9. Trace mitral regurgitation.    has family h/o asc ao aneurysm - mother/brother    underwent MV repair and TV repair, pfo closure w Dr. Devine at [facility]   course c/b mild transaminitis and pericarditis - started on colchicine  discharge echo  Echo discharge at [facility] /: normal lv size, ef 65%, trace ai, s/p mv repair, mean gradient 3, trace mr, s/p TV repair, mean gradient 2.5, trace     started on asa, statin    -CTA : calcium score 754, 96%, mild prox LAD, minimal mid LAD, minimal D2, mild prox LCx, mild mid LCx, minimal OM2, mild prox/mid RCA, thickening of MV leaflets w prolapse of post mitral leaflet   -[facility] : normal lv size/fxn, MVP post leaflet, prolapse lateral P2 w max prolpase height 10mm, lesser degree prolapse central/medial P2, mod to severe MR, systolic flow reversal pulm veins c/w severe mr, EROA .53, RV 71, diffuse TVP w mild TR, trace ai, minimal plaque visualized portion aortic arch    family hx of asc ao aneurysm rupture/dissection    CT chest: : 4.9 mm solid nodule RML, stable mild centrilobular emphysematous changes with upper lobe predominance, mild to mod 3 vessel cad  ETT: : exercised 9.75 minutes to 104% mphr, no ekg changes  Echo : normal lv size/thickness, ef 65%, no wma, lae, mild ai, severe MVP posterior leaflet, mod to severe MR, trace tr, pasp 20, trace pr  Abd u/s: : no AAA    walks 3 miles/day  no mental health issues    on statin for past 2 years    PMH/PSH:  cad  predm  left temporal artery aneurysm s/p ligation   right breast cancer s/p mastectomy/LN dissection   gerd  osteoporosis  cad  constipation  hl  mvp/mod to severe MR  hl  lymphedema arm  pulm nodule  acl repair   breast mastectomy  s/p MV/TV repair, closure pfo         ALL:  nkda      MEDS:  asa 81 mg qd  lipitor 20 mg qhs  premarin vaginal cream  zoledronic acid      SH:  former smoker quit , h/o 40 pack years  rare etoh  no drugs  single  live alone  no children  from [place]      FH:  mother -  aortic aneurysm, 64  father -  59 bladder cancer, MI 45  brother - 73's cabg, valve replacement  twin brother - 69, alive, asc ao aneurysm

## 2023-08-03 NOTE — DISCUSSION/SUMMARY
[Patient] : the patient [___ Month(s)] : in [unfilled] month(s) 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, family h/o early cvd who presents for f/up today s/p MV/TV repair, pfo closure 4/23 - now w dilated asc ao/tachycardia  -CT aorta 7/23: IMPRESSION: 1.  No aortic dissection or aneurysm. Upper normal caliber ascending aorta measuring up to 3.8 cm. 2.  Increased moderate atelectasis of the right lung base and new right hemidiaphragm elevation. Patent central airways 3.  Dilated common bile duct measuring up to 10 mm. Outpatient follow-up with MRCP is suggested. severe cad, minimal ao calcifications  -Echo 7/23  CONCLUSIONS:  1. Normal left ventricular cavity size. The left ventricular wall thickness is normal. The left ventricular systolic function is normal with an ejection fraction visually estimated at 65 to 70 %. There are no regional wall motion abnormalities seen. 2. There is normal left ventricular diastolic function. 3. Normal right ventricular cavity size, normal right ventricular wall thickness and reduced systolic right ventricular function. The tricuspid annular plane systolic excursion (TAPSE) is 0.9 cm (normal >=1.7 cm). 4. Trace aortic regurgitation. 5. Dilated proximal ascending aorta. 6. No pericardial effusion seen. 7. S/P surgical mitral repair. The mean mitral gradient is 5mmHg. 8. Annuloplasty ring is noted in the tricuspid valve position. S/P surgical tricuspid repair.The mean tricuspid gradient is 9 which is elevated. No significant regurgitation. 9. Trace mitral regurgitation.  -Holter monitor ordered   -close monitoring blood sugar for predm  -f/up w Dr. Stearns for asc aorta aneurysm -genetic testing discussed, 6 mth for CTA chest  -abx in future for preprocedures given mv/tv bands  -cardiac rehab   -hold BB for now  -s/p MV/TV repair, pfo closure 4/23  -ekg 7/23 - ST, normal intervals, nsra  -Echo at [facility] from discharge 4/14/23: normal lv size, ef 65%, trace ai, s/p mv repair, mean gradient 3, trace mr, s/p TV repair, mean gradient 2.5, trace tr  -CTA 1/23: calcium score 754, 96%, mild prox LAD, minimal mid LAD, minimal D2, mild prox LCx, mild mid LCx, minimal OM2, mild prox/mid RCA, thickening of MV leaflets w prolapse of post mitral leaflet  -can consider cath or stress echo for persistent sob  -pulm/thoracics evaluation for right hemidiaphragm elevation  -labs 2023 reviewed   -MARCIO 12/22: normal lv size/fxn, MVP post leaflet, prolapse lateral P2 w max prolpase height 10mm, lesser degree prolapse central/medial P2, mod to severe MR, systolic flow reversal pulm veins c/w severe mr, EROA.53, RV 71, diffuse TVP w mild TR, trace ai, minimal plaque visualized portion aortic arch  -ekg 12/22 - nsr, normal intervals, no st/t changes  -continue statin  -continue asa 81  -CT chest: 2022: 4.9 mm solid nodule RML, stable mild centrilobular emphysematous changes with upper lobe predominance, mild to mod 3 vessel cad  -ETT: 2021: exercised 9.75 minutes to 104% mphr, no ekg changes  -Echo 2021: normal lv size/thickness, ef 65%, no wma, lae, mild ai, severe MVP posterior leaflet, mod to severe MR, trace tr, pasp 20, trace pr  -serial echo's yearly next due 4/24  -Abd u/s: 11/21: no AAA  -counseled on cvd risk factors  -f/up 1 month for sob, cad    I have spent 30 minutes reviewing labs, records, tests and discussing cad, cvd risk factors, s/p mv/tv repair, cad

## 2023-08-04 NOTE — END OF VISIT
[Time Spent: ___ minutes] : I have spent [unfilled] minutes of time on the encounter. [FreeTextEntry3] : I, ERIC LEZAMA , am scribing for and in the presence of SURENDRA CASILLAS the following sections: History of present illness, past Medical/family/surgical/family/social history, review of systems, vital signs, physical exam and disposition.  I personally performed the services described in the documentation, reviewed the documentation recorded by the scribe in my presence and it accurately and completely records my words and actions.

## 2023-08-04 NOTE — HISTORY OF PRESENT ILLNESS
[FreeTextEntry1] : 67 y/o female with h/o left temporal artery aneurysm s/p ligation, right breast cancer s/p mastectomy/LN dissection (no chemo/xrt), gerd, cad, hl, predm,  mvp/mod-sev MR   s/p MV/TV repair, closure pfo 4/10/23 at [facility] (Dr. Geovanny Devine), presents for evaluation and management of dilated ascending aorta.  Family history significant for aortic dissections and thoracic aortic aneurysms. (mother  from a ruptured aneurysm, brother had TAA repair, twin brother has TAA under surveillance, cousin from maternal side with possible Marfan).   CTA 23 ascending aorta 3.8cm.  right hemidiaphragm.  moderate atelectasis of the right lung base dilated common bile duct up to 10mm.   Patient reports CASTRO.

## 2023-08-04 NOTE — ASSESSMENT
[FreeTextEntry1] : 67 y/o female with h/o left temporal artery aneurysm s/p ligation, right breast cancer s/p mastectomy/LN dissection (no chemo/xrt), gerd, cad, hl, predm,  mvp/mod-sev MR   s/p MV/TV repair, closure pfo 4/10/23 at [facility], presents for evaluation and management of dilated ascending aorta.  Family history significant for aortic dissections and thoracic aortic aneurysms. (mother  from a ruptured aneurysm, brother had TAA repair, twin brother has TAA under surveillance, cousin from maternal side with possible Marfan).     The patient's medical records and diagnostic images were reviewed at the time of this office visit, and the following recommendation was made. CT and TTE was completed of the thoracic aorta. The report was reviewed and noted in the chart, I independently reviewed and interpreted images and report and I reviewed the films/CD and agree.  Patient does not meet criteria for surgical intervention at the time and will be entered into the aortic registry surveillance program. However, given her significant family history, I highly recommend the patient for genetic testing.   Plan - Referral to cardio [facility] for genetic counseling and testing.  - Follow up [facility] in six months with gated CTA chest. - Continue medication regimen. - Follow up with cardiologist and PCP. - Blood pressure management. - Discussed signs and symptoms that warrant emergency medical attention. - Referral to thoracic surgery for elevated susu diaphragm and shortness of breath.

## 2023-08-04 NOTE — DATA REVIEWED
[FreeTextEntry1] : TTE 7/18/23 1. Normal left ventricular cavity size. The left ventricular wall thickness is normal. The left ventricular systolic function is normal with an ejection fraction visually estimated at 65 to 70 %. There are no regional wall motion abnormalities seen. 2. There is normal left ventricular diastolic function. 3. Normal right ventricular cavity size, normal right ventricular wall thickness and reduced systolic right ventricular function. The tricuspid annular plane systolic excursion (TAPSE) is 0.9 cm (normal >=1.7 cm). 4. Trace aortic regurgitation. 5. Dilated proximal ascending aorta. 6. No pericardial effusion seen. 7. S/P surgical mitral repair. The mean mitral gradient is 5mmHg. 8. Annuloplasty ring is noted in the tricuspid valve position. S/P surgical tricuspid repair.The mean tricuspid gradient is 9 which is elevated. No significant regurgitation. 9. Trace mitral regurgitation.   CTA coronaries 1/4/23 Cardiac: 1.  The calcium score is severe at 754 Agatston units, which is at the 96 percentile, adjusted for age, gender and race. 2.  Nonobstructive coronary artery disease. 3.  Thickening of the mitral valve leaflets with prolapse of the posterior mitral valve leaflet.  Non-cardiac: 1. Marked multilevel degenerative disc disease with a moderate S-shaped thoracolumbar scoliosis.

## 2023-08-04 NOTE — PHYSICAL EXAM
[General Appearance - Alert] : alert [General Appearance - In No Acute Distress] : in no acute distress [Sclera] : the sclera and conjunctiva were normal [Outer Ear] : the ears and nose were normal in appearance [Neck Appearance] : the appearance of the neck was normal [] : the neck was supple [Jugular Venous Distention Increased] : there was no jugular-venous distention [Respiration, Rhythm And Depth] : normal respiratory rhythm and effort [Exaggerated Use Of Accessory Muscles For Inspiration] : no accessory muscle use [Auscultation Breath Sounds / Voice Sounds] : lungs were clear to auscultation bilaterally [Heart Sounds] : normal S1 and S2 [Examination Of The Chest] : the chest was normal in appearance [2+] : left 2+ [Bowel Sounds] : normal bowel sounds [Abdomen Soft] : soft [No CVA Tenderness] : no ~M costovertebral angle tenderness [Abnormal Walk] : normal gait [Skin Color & Pigmentation] : normal skin color and pigmentation [No Focal Deficits] : no focal deficits [Oriented To Time, Place, And Person] : oriented to person, place, and time [FreeTextEntry1] : deferred

## 2023-08-04 NOTE — PROCEDURE
[FreeTextEntry1] : Dr. Stearns reviewed the indications for surgery, and used our webpage [email or web address] <[email or web address]> to illustrate the aorta and anatomy of the heart. Those indications are the following: size greater than 5.0 cm, symptomatic aneurysms, family history of aortic dissection or aneurysm death with a size greater than 4.5 cm, other necessary cardiac procedures such as coronary artery bypass grafting or valvular disorders with an aneurysm greater than 4.5 cm, or connective tissue disorders with an aneurysm size greater than 4.5 cm. The patient does not meet size criteria for surgical intervention at the time.  Patient was advised to view the educational video prior to this visit regarding aortic pathology, risk factors, surgical procedures, and lifestyle modifications. Video can be retrieved at [email or web address].

## 2023-08-07 ENCOUNTER — APPOINTMENT (OUTPATIENT)
Age: 68
End: 2023-08-07
Payer: MEDICARE

## 2023-08-07 ENCOUNTER — APPOINTMENT (OUTPATIENT)
Dept: HEART AND VASCULAR | Facility: CLINIC | Age: 68
End: 2023-08-07

## 2023-08-07 ENCOUNTER — TRANSCRIPTION ENCOUNTER (OUTPATIENT)
Age: 68
End: 2023-08-07

## 2023-08-07 DIAGNOSIS — K83.8 OTHER SPECIFIED DISEASES OF BILIARY TRACT: ICD-10-CM

## 2023-08-07 PROCEDURE — 99203 OFFICE O/P NEW LOW 30 MIN: CPT | Mod: 95

## 2023-08-08 PROBLEM — K83.8 DILATED BILE DUCT: Status: ACTIVE | Noted: 2023-08-08

## 2023-08-08 NOTE — ASSESSMENT
[FreeTextEntry1] : Dilated CBD no clear etiology - GB in situ - Normal LFTs - Incidental finding  Plan EGD EUS @[facility]

## 2023-08-08 NOTE — REASON FOR VISIT
[Home] : at home, [unfilled] , at the time of the visit. [Medical Office: ([facility])___] : at the medical office located in  [Patient] : the patient [Initial Evaluation] : an initial evaluation [FreeTextEntry1] : Dilated CBD

## 2023-08-10 ENCOUNTER — APPOINTMENT (OUTPATIENT)
Dept: THORACIC SURGERY | Facility: CLINIC | Age: 68
End: 2023-08-10
Payer: MEDICARE

## 2023-08-10 VITALS
HEART RATE: 116 BPM | SYSTOLIC BLOOD PRESSURE: 102 MMHG | OXYGEN SATURATION: 91 % | HEIGHT: 60 IN | DIASTOLIC BLOOD PRESSURE: 67 MMHG | RESPIRATION RATE: 16 BRPM | BODY MASS INDEX: 19.44 KG/M2 | WEIGHT: 99 LBS | TEMPERATURE: 97.9 F

## 2023-08-10 PROCEDURE — 99214 OFFICE O/P EST MOD 30 MIN: CPT

## 2023-08-10 RX ORDER — ZOLEDRONIC ACID 4 MG/5ML
4 INJECTION, SOLUTION, CONCENTRATE INTRAVENOUS
Refills: 0 | Status: DISCONTINUED | COMMUNITY
End: 2023-08-10

## 2023-08-10 RX ORDER — AMOXICILLIN 500 MG/1
500 CAPSULE ORAL
Qty: 4 | Refills: 6 | Status: DISCONTINUED | COMMUNITY
Start: 2023-07-19 | End: 2023-08-10

## 2023-08-10 RX ORDER — FLUTICASONE PROPIONATE 50 UG/1
SPRAY, METERED NASAL
Refills: 0 | Status: DISCONTINUED | COMMUNITY
End: 2023-08-10

## 2023-08-11 ENCOUNTER — APPOINTMENT (OUTPATIENT)
Dept: PULMONOLOGY | Facility: CLINIC | Age: 68
End: 2023-08-11
Payer: MEDICARE

## 2023-08-11 VITALS
WEIGHT: 100 LBS | SYSTOLIC BLOOD PRESSURE: 106 MMHG | BODY MASS INDEX: 19.63 KG/M2 | DIASTOLIC BLOOD PRESSURE: 76 MMHG | HEIGHT: 60 IN | OXYGEN SATURATION: 97 % | HEART RATE: 100 BPM | TEMPERATURE: 97.9 F

## 2023-08-11 PROCEDURE — 94010 BREATHING CAPACITY TEST: CPT

## 2023-08-11 PROCEDURE — 99203 OFFICE O/P NEW LOW 30 MIN: CPT | Mod: 25

## 2023-08-13 NOTE — PHYSICAL EXAM
[No Acute Distress] : no acute distress [Normal Appearance] : normal appearance [Normal Oropharynx] : normal oropharynx [No Neck Mass] : no neck mass [Normal Rate/Rhythm] : normal rate/rhythm [Normal S1, S2] : normal s1, s2 [No Murmurs] : no murmurs [No Resp Distress] : no resp distress [Clear to Auscultation Bilaterally] : clear to auscultation bilaterally [No Abnormalities] : no abnormalities [Benign] : benign [Normal Gait] : normal gait [No Clubbing] : no clubbing [No Cyanosis] : no cyanosis [No Edema] : no edema [FROM] : FROM [Normal Color/ Pigmentation] : normal color/ pigmentation [No Focal Deficits] : no focal deficits [Oriented x3] : oriented x3 [Normal Affect] : normal affect [TextBox_68] : slight reduction in bs on the right

## 2023-08-13 NOTE — DISCUSSION/SUMMARY
[FreeTextEntry1] : we spent most of the day discussion what dr. nieves did  give a bit more time, then discussed pros and cons of plication  will come back for full pfts

## 2023-08-13 NOTE — HISTORY OF PRESENT ILLNESS
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  use to smoke   no problems breathing before hand

## 2023-08-14 ENCOUNTER — APPOINTMENT (OUTPATIENT)
Dept: PULMONOLOGY | Facility: CLINIC | Age: 68
End: 2023-08-14
Payer: MEDICARE

## 2023-08-14 PROCEDURE — ZZZZZ: CPT

## 2023-08-14 NOTE — PHYSICAL EXAM
[Neck Appearance] : the appearance of the neck was normal [Neck Cervical Mass (___cm)] : no neck mass was observed [Jugular Venous Distention Increased] : there was no jugular-venous distention [Thyroid Diffuse Enlargement] : the thyroid was not enlarged [Thyroid Nodule] : there were no palpable thyroid nodules [] : no respiratory distress [Auscultation Breath Sounds / Voice Sounds] : lungs were clear to auscultation bilaterally [Deep Tendon Reflexes (DTR)] : deep tendon reflexes were 2+ and symmetric [Sensation] : the sensory exam was normal to light touch and pinprick [No Focal Deficits] : no focal deficits [Oriented To Time, Place, And Person] : oriented to person, place, and time [Impaired Insight] : insight and judgment were intact [Affect] : the affect was normal [FreeTextEntry1] : mild kyphosis noticed

## 2023-08-14 NOTE — DATA REVIEWED
[FreeTextEntry1] : CXR 1/17/23: impression: Heart size within normal limits, thoracic aortic calcification. Lungs and mediastinum are unremarkable. Thoracic spine degenerative changes, kypho S-shaped scoliosis.

## 2023-08-14 NOTE — ASSESSMENT
[FreeTextEntry1] : 68 yr old female, former smoker, with a PMHx of left temporal artery aneurysm s/p ligation, right breast cancer s/p mastectomy (no chemo/xrt), GERD, CAD, HLD, preDM, mvp/mod-sev MR s/p MV/TV repair, closure pfo 4/10/23 at [facility] (Dr. Geovanny Devine).  She is referred by Dr. Stearns for evaluation of the elevated right susu diaphragm.   Patient reports shortness of breath developed after the mitral valve replacement surgery in April, which limited her normal activities. CTA C/A/P 7/17/23 was reviewed, which showed increased moderate atelectasis of the right lung base and new right hemidiaphragm elevation.   We discussed that phrenic nerve injury related to her cardiac surgery in April could be the etiology of her paralyzed diaphragm.  If not permanently injured, phrenic nerve paresis can take up to six months for recovery. We recommend repeating a chest XRAY in two months to assess potential recovery of the nerve. We will consider surgical intervention (plication) in case the diaphragm remained elevated.    Plan: Follow up in 2 months with a CXR.   HOLA Goncalves, was scribe [and  if needed] for and in the presence of Dr. Charlotte Martin for the following sections: history of present illness, past medical/surgical/family/social/ allergy history, review of systems, vital signs, physical exam, and disposition.  ICharlotte MD personally performed the services described in the documentation, reviewed the documentation recorded by the scribe in my presence and it accurately and completely records my words and actions. I have personally seen, examined, and participated in the care of this patient. I have reviewed all pertinent clinical information, including history and physical exam and plan. I agree with the above history, physical and plan of the ACP which I have reviewed and edited where appropriate.

## 2023-08-14 NOTE — HISTORY OF PRESENT ILLNESS
[FreeTextEntry1] : Patient is a 68 yr old female, former smoker, with a PMHx of left temporal artery aneurysm s/p ligation, right breast cancer s/p mastectomy (no chemo/xrt), GERD, CAD, HLD, preDM, mvp/mod-sev MR s/p MV/TV repair, closure pfo 4/10/23 at [facility] (Dr. Geovanny Devine).   She presents today for initial evaluation of an elevated right susu diaphragm. She is referred by Dr. Stearns.  Patient reports shortness of breath developed after the mitral valve replacement surgery in April, which limited her activities on exertion. She has no chest pain.   CTA C/A/P 7/17/23: -No aortic dissection or aneurysm. Upper normal caliber ascending aorta measuring up to 3.8 cm. -Increased moderate atelectasis of the right lung base and new right hemidiaphragm elevation. Patient central airways. -Dilated common bile duct measuring up to 10 mm. Outpatient follow up with MRCP is suggested.  CXR 4/14/23: -Elevated right hemidiaphragm and right basilar atelectasis. No acute pulmonary disease.

## 2023-08-17 ENCOUNTER — TRANSCRIPTION ENCOUNTER (OUTPATIENT)
Age: 68
End: 2023-08-17

## 2023-09-01 ENCOUNTER — TRANSCRIPTION ENCOUNTER (OUTPATIENT)
Age: 68
End: 2023-09-01

## 2023-09-02 ENCOUNTER — NON-APPOINTMENT (OUTPATIENT)
Age: 68
End: 2023-09-02

## 2023-09-05 ENCOUNTER — APPOINTMENT (OUTPATIENT)
Dept: HEART AND VASCULAR | Facility: CLINIC | Age: 68
End: 2023-09-05
Payer: MEDICARE

## 2023-09-05 VITALS
SYSTOLIC BLOOD PRESSURE: 106 MMHG | HEIGHT: 60 IN | BODY MASS INDEX: 19.48 KG/M2 | OXYGEN SATURATION: 94 % | HEART RATE: 98 BPM | WEIGHT: 99.2 LBS | DIASTOLIC BLOOD PRESSURE: 76 MMHG

## 2023-09-05 PROCEDURE — 99214 OFFICE O/P EST MOD 30 MIN: CPT | Mod: 25

## 2023-09-05 NOTE — DISCUSSION/SUMMARY
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 No aortic dissection or aneurysm. Upper normal caliber ascending aorta measuring up to 3.8 cm. 2. Increased moderate atelectasis of the right lung base and new right hemidiaphragm elevation. Patent central airways 3. Dilated common bile duct measuring up to 10 mm. Outpatient follow-up with MRCP is suggested. severe cad, minimal ao calcifications  -Echo 7/23 CONCLUSIONS:  1. Normal left ventricular cavity size. The left ventricular wall thickness is normal. The left ventricular systolic function is normal with an ejection fraction visually estimated at 65 to 70 %. There are no regional wall motion abnormalities seen. 2. There is normal left ventricular diastolic function. 3. Normal right ventricular cavity size, normal right ventricular wall thickness and reduced systolic right ventricular function. The tricuspid annular plane systolic excursion (TAPSE) is 0.9 cm (normal >=1.7 cm). 4. Trace aortic regurgitation. 5. Dilated proximal ascending aorta. 6. No pericardial effusion seen. 7. S/P surgical mitral repair. The mean mitral gradient is 5mmHg. 8. Annuloplasty ring is noted in the tricuspid valve position. S/P surgical tricuspid repair.The mean tricuspid gradient is 9 which is elevated. No significant regurgitation. 9. Trace mitral regurgitation.  -Holter monitor 8/23: SR, avg hr 95, 73 sve's, 117 pvc's  -close monitoring blood sugar for predm  -f/up w Dr. Stearns for asc aorta aneurysm -genetic testing discussed, 6 mth for CTA chest  -abx in future for preprocedures given mv/tv bands  -cardiac rehab   -hold BB for now  -s/p MV/TV repair, pfo closure 4/23  -ekg 7/23 - ST, normal intervals, nsra  -Echo at [facility] from discharge 4/14/23: normal lv size, ef 65%, trace ai, s/p mv repair, mean gradient 3, trace mr, s/p TV repair, mean gradient 2.5, trace tr  -CTA 1/23: calcium score 754, 96%, mild prox LAD, minimal mid LAD, minimal D2, mild prox LCx, mild mid LCx, minimal OM2, mild prox/mid RCA, thickening of MV leaflets w prolapse of post mitral leaflet     -thoracis f/up for phrenic nerve paresis. right hemidiaphragm elevation  -labs 2023 reviewed  -MARCIO 12/22: normal lv size/fxn, MVP post leaflet, prolapse lateral P2 w max prolpase height 10mm, lesser degree prolapse central/medial P2, mod to severe MR, systolic flow reversal pulm veins c/w severe mr, EROA.53, RV 71, diffuse TVP w mild TR, trace ai, minimal plaque visualized portion aortic arch  -ekg 12/22 - nsr, normal intervals, no st/t changes  -continue statin  -continue asa 81  -CT chest: 2022: 4.9 mm solid nodule RML, stable mild centrilobular emphysematous changes with upper lobe predominance, mild to mod 3 vessel cad  -ETT: 2021: exercised 9.75 minutes to 104% mphr, no ekg changes  -Echo 2021: normal lv size/thickness, ef 65%, no wma, lae, mild ai, severe MVP posterior leaflet, mod to severe MR, trace tr, pasp 20, trace pr  -serial echo's yearly next due 4/24  -Abd u/s: 11/21: no AAA  -counseled on cvd risk factors  -f/up 6 months for cvd risk factors    I have spent 30 minutes reviewing labs, records, tests and discussing cad, cvd risk factors, s/p mv/tv repair, cad.

## 2023-09-05 NOTE — HISTORY OF PRESENT ILLNESS
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 Martin - phrenic nerve paresis seen by pulm  seen by Dr. Stearns - for aneurysm screening - referred for genetic testing notes some sob w increased exercise, walk/talk noted to have elevated right hemidiaphragm new on imaging    has been off BB      -Echo  CONCLUSIONS:  1. Normal left ventricular cavity size. The left ventricular wall thickness is normal. The left ventricular systolic function is normal with an ejection fraction visually estimated at 65 to 70 %. There are no regional wall motion abnormalities seen. 2. There is normal left ventricular diastolic function. 3. Normal right ventricular cavity size, normal right ventricular wall thickness and reduced systolic right ventricular function. The tricuspid annular plane systolic excursion (TAPSE) is 0.9 cm (normal >=1.7 cm). 4. Trace aortic regurgitation. 5. Dilated proximal ascending aorta. 6. No pericardial effusion seen. 7. S/P surgical mitral repair. The mean mitral gradient is 5mmHg. 8. Annuloplasty ring is noted in the tricuspid valve position. S/P surgical tricuspid repair.The mean tricuspid gradient is 9 which is elevated. No significant regurgitation. 9. Trace mitral regurgitation.   has family h/o asc ao aneurysm - mother/brother    underwent MV repair and TV repair, pfo closure w Dr. Devine at [place]   course c/b mild transaminitis and pericarditis - started on colchicine  discharge echo  Echo discharge at [facility] /: normal lv size, ef 65%, trace ai, s/p mv repair, mean gradient 3, trace mr, s/p TV repair, mean gradient 2.5, trace    started on asa, statin    -CTA 1/23: calcium score 754, 96%, mild prox LAD, minimal mid LAD, minimal D2, mild prox LCx, mild mid LCx, minimal OM2, mild prox/mid RCA, thickening of MV leaflets w prolapse of post mitral leaflet   -MARCIO : normal lv size/fxn, MVP post leaflet, prolapse lateral P2 w max prolpase height 10mm, lesser degree prolapse central/medial P2, mod to severe MR, systolic flow reversal pulm veins c/w severe mr, EROA .53, RV 71, diffuse TVP w mild TR, trace ai, minimal plaque visualized portion aortic arch    family hx of asc ao aneurysm rupture/dissection    CT chest: : 4.9 mm solid nodule RML, stable mild centrilobular emphysematous changes with upper lobe predominance, mild to mod 3 vessel cad  ETT: : exercised 9.75 minutes to 104% mphr, no ekg changes  Echo : normal lv size/thickness, ef 65%, no wma, lae, mild ai, severe MVP posterior leaflet, mod to severe MR, trace tr, pasp 20, trace pr  Abd u/s: : no AAA    walks 3 miles/day  no mental health issues    on statin for past 2 years    PMH/PSH:  cad  predm  left temporal artery aneurysm s/p ligation   right breast cancer s/p mastectomy/LN dissection   gerd  osteoporosis  cad  constipation  hl  mvp/mod to severe MR  hl  lymphedema arm  pulm nodule  acl repair   breast mastectomy  s/p MV/TV repair, closure pfo   elevated right hemidiaphragm/phrenic nerve paresis      ALL:  nkda      MEDS:  asa 81 mg qd  lipitor 20 mg qhs  premarin vaginal cream  zoledronic acid      SH:  former smoker quit , h/o 40 pack years  rare etoh  no drugs  single  live alone  no children  from [place]      FH:  mother -  aortic aneurysm, 64  father -  59 bladder cancer, MI 45  brother - 73's cabg, valve replacement  twin brother - 69, alive, asc ao aneurysm

## 2023-09-07 ENCOUNTER — APPOINTMENT (OUTPATIENT)
Dept: CARDIOLOGY | Facility: CLINIC | Age: 68
End: 2023-09-07
Payer: MEDICARE

## 2023-09-07 VITALS
WEIGHT: 102 LBS | TEMPERATURE: 98.5 F | BODY MASS INDEX: 20.03 KG/M2 | HEIGHT: 60 IN | HEART RATE: 102 BPM | SYSTOLIC BLOOD PRESSURE: 100 MMHG | OXYGEN SATURATION: 96 % | DIASTOLIC BLOOD PRESSURE: 66 MMHG

## 2023-09-07 PROCEDURE — 99214 OFFICE O/P EST MOD 30 MIN: CPT

## 2023-09-07 NOTE — ASSESSMENT
[TextEntry] : CONY HOLBROOK  is a 68 year F  with a history of (see above). The differences between hereditary and sporadic insert diagnosis were reviewed with the patient.  She  has elected to undergo genetic testing due to concerns for  personal history, definitive diagnosis, disease management, family history, concern for the health of family members . Results may change medical management for the patient and may affect the healthcare of other family members. She  expressed understanding of the presented information and satisfaction with having all of Her questions and concerns addressed

## 2023-09-07 NOTE — PLAN
[TextEntry] :  1.	Informed Consent obtained for the Marfan TAAD sequencing and Del/Dup panel 26 genes (#533)   2.	Blood drawn today to be sent to [facility] for analysis, pending insurance authorization.  3.	 Ms. CONY HOLBROOK  was provided an information sheet about her genetic testing.  4.	A follow-up appointment was scheduled in 2-3 months to discuss genetic testing results in person. Results generally return in 6-8 weeks.  For any additional questions please call  Jing Cope MS, CGC or Humphrey Mcguire MD, PhD at [phone] Time spent counseling the patient during the visit was 60 min. >50% time spent in care coordination /counseling for discussion of cardiac risk and appropriate management.   patient seen with Jing Cope MS, CGC for genetic counselling

## 2023-09-07 NOTE — REASON FOR VISIT
[FreeTextEntry3] : Dear Dr. Garcia and Dr Martin       . I saw your patient CONY HOLBROOK on 09/07/2023 . Please see the note below for the assessment and plan.   CONY HOLBROOK  was seen  for an initial consultation at the Cardiogenomics Program at [facility] on 09/07/2023.   Ms. HOLBROOK was referred by  Dr. Garcia and Dr Martin   for hereditary cardiac predisposition risk assessment and counseling, due to hx aortic enlargement and FH aortic aneurysm

## 2023-09-07 NOTE — DATA REVIEWED
[FreeTextEntry1] : CTA C/A/P 7/17/23: -No aortic dissection or aneurysm. Upper normal caliber ascending aorta measuring up to 3.8 cm. -Increased moderate atelectasis of the right lung base and new right hemidiaphragm elevation. Patient central airways. -Dilated common bile duct measuring up to 10 mm. Outpatient follow up with MRCP is suggested.

## 2023-09-07 NOTE — SIGNATURES
[TextEntry] : Humphrey Mcguire MD, PhD  Medical Director Program for Cardiac Genetics, Genomics and Precision Medicine Department of Cardiology [facility]  [employer] at [address] [place] [address] Tel: [phone] Fax: [phone]  ([facility] office) [address], 3rd floor (between [address]) [place] [address] (p) [phone] (f) [phone]

## 2023-09-07 NOTE — DISCUSSION/SUMMARY
[TextEntry] : We reviewed the risks, benefits, limitations, and implications of genetic testing.  Additionally, we examined the patients motivation for testing, and the emotional ramifications of the test results.  The patient is aware that results may impact family members.  Counseling resources are available if requested.   CAL[place], the Genetic Information Non-discrimination Act protects most people from discrimination in health insurance and employment at firms with over 50 employees.  CALEB does not protect against use of genetic information by life insurance, disability, or long-term care insurers.  Further information on other limitations is available at [email or web address].   After a review of testing options, the patient elected to the Marfan TAAD panel offered through [facility] [employer]. . Panels contain 26  genes associated with varying levels of risk for aortic aneurysm and dissection. We reviewed the three possible results for each gene on this panel: positive, negative and variant of uncertain significance (VUS).  We discussed that panel testing could result in incidental findings, such as identifying a positive result in a gene with cardiac risks not seen in the current personal or discussed family history. If results are positive, we would discuss the  risks and management options associated with that cardiac condition susceptibility gene and discuss genetic testing for family members.  Pedigree was reviewed with the patient to identify those who should be tested if the patient is positive.  If results are negative or a VUS is identified, the patient would continue to be managed based on personal and family history of their  cardiac condition.

## 2023-09-07 NOTE — HISTORY OF PRESENT ILLNESS
[Other Location: e.g. School (Enter Location, City,State)___] : at [unfilled], at the time of the visit. [Other Location: e.g. Home (Enter Location, City,State)___] : at [unfilled] [Verbal consent obtained from patient] : the patient, [unfilled] [FreeTextEntry1] : CONY HOLBROOK is a 67 yo F PMH left temporal artery aneurysm s/p ligation, right br CA s/p masectomy (no chemo/xrt), GERD, CAD, HLD, preDM, MVP/mod-severe MR s/p MV/TV repair and PFO closure 4/10/23 at [facility], Dr. Geovanny Devine complicated by rt susu diaphragm elevation.  her family history is significant for aortic anuerysm in her brother  today she  is referred for a cardiogenomic evaluation

## 2023-09-14 ENCOUNTER — APPOINTMENT (OUTPATIENT)
Age: 68
End: 2023-09-14
Payer: MEDICARE

## 2023-09-14 PROCEDURE — 43237 ENDOSCOPIC US EXAM ESOPH: CPT

## 2023-10-12 ENCOUNTER — APPOINTMENT (OUTPATIENT)
Dept: THORACIC SURGERY | Facility: CLINIC | Age: 68
End: 2023-10-12
Payer: MEDICARE

## 2023-10-12 ENCOUNTER — RESULT REVIEW (OUTPATIENT)
Age: 68
End: 2023-10-12

## 2023-10-12 ENCOUNTER — OUTPATIENT (OUTPATIENT)
Dept: OUTPATIENT SERVICES | Facility: HOSPITAL | Age: 68
LOS: 1 days | End: 2023-10-12
Payer: MEDICARE

## 2023-10-12 VITALS
SYSTOLIC BLOOD PRESSURE: 99 MMHG | RESPIRATION RATE: 16 BRPM | HEIGHT: 60 IN | HEART RATE: 99 BPM | TEMPERATURE: 97.9 F | WEIGHT: 99 LBS | OXYGEN SATURATION: 92 % | DIASTOLIC BLOOD PRESSURE: 70 MMHG | BODY MASS INDEX: 19.44 KG/M2

## 2023-10-12 DIAGNOSIS — Z98.890 OTHER SPECIFIED POSTPROCEDURAL STATES: Chronic | ICD-10-CM

## 2023-10-12 PROCEDURE — 71046 X-RAY EXAM CHEST 2 VIEWS: CPT

## 2023-10-12 PROCEDURE — 71046 X-RAY EXAM CHEST 2 VIEWS: CPT | Mod: 26

## 2023-10-12 PROCEDURE — 99214 OFFICE O/P EST MOD 30 MIN: CPT

## 2023-10-13 ENCOUNTER — TRANSCRIPTION ENCOUNTER (OUTPATIENT)
Age: 68
End: 2023-10-13

## 2023-10-16 ENCOUNTER — TRANSCRIPTION ENCOUNTER (OUTPATIENT)
Age: 68
End: 2023-10-16

## 2023-10-30 ENCOUNTER — TRANSCRIPTION ENCOUNTER (OUTPATIENT)
Age: 68
End: 2023-10-30

## 2023-10-30 ENCOUNTER — APPOINTMENT (OUTPATIENT)
Dept: RADIOLOGY | Facility: HOSPITAL | Age: 68
End: 2023-10-30
Payer: MEDICARE

## 2023-10-30 ENCOUNTER — OUTPATIENT (OUTPATIENT)
Dept: OUTPATIENT SERVICES | Facility: HOSPITAL | Age: 68
LOS: 1 days | End: 2023-10-30
Payer: MEDICARE

## 2023-10-30 DIAGNOSIS — Z90.11 ACQUIRED ABSENCE OF RIGHT BREAST AND NIPPLE: Chronic | ICD-10-CM

## 2023-10-30 DIAGNOSIS — Z98.890 OTHER SPECIFIED POSTPROCEDURAL STATES: Chronic | ICD-10-CM

## 2023-10-30 PROCEDURE — 76000 FLUOROSCOPY <1 HR PHYS/QHP: CPT | Mod: 26

## 2023-10-30 PROCEDURE — 76000 FLUOROSCOPY <1 HR PHYS/QHP: CPT

## 2023-10-31 PROBLEM — R06.02 SHORTNESS OF BREATH ON EXERTION: Status: ACTIVE | Noted: 2023-08-10

## 2023-11-02 ENCOUNTER — APPOINTMENT (OUTPATIENT)
Dept: CARDIOLOGY | Facility: CLINIC | Age: 68
End: 2023-11-02
Payer: MEDICARE

## 2023-11-02 ENCOUNTER — APPOINTMENT (OUTPATIENT)
Dept: THORACIC SURGERY | Facility: CLINIC | Age: 68
End: 2023-11-02
Payer: MEDICARE

## 2023-11-02 VITALS
DIASTOLIC BLOOD PRESSURE: 62 MMHG | HEIGHT: 60 IN | SYSTOLIC BLOOD PRESSURE: 64 MMHG | RESPIRATION RATE: 17 BRPM | WEIGHT: 99 LBS | OXYGEN SATURATION: 95 % | TEMPERATURE: 97.9 F | BODY MASS INDEX: 19.44 KG/M2 | HEART RATE: 86 BPM

## 2023-11-02 VITALS
DIASTOLIC BLOOD PRESSURE: 68 MMHG | SYSTOLIC BLOOD PRESSURE: 112 MMHG | WEIGHT: 103 LBS | HEART RATE: 95 BPM | OXYGEN SATURATION: 96 % | TEMPERATURE: 97.2 F | BODY MASS INDEX: 20.22 KG/M2 | HEIGHT: 60 IN

## 2023-11-02 DIAGNOSIS — R06.02 SHORTNESS OF BREATH: ICD-10-CM

## 2023-11-02 PROCEDURE — 99213 OFFICE O/P EST LOW 20 MIN: CPT

## 2023-11-02 PROCEDURE — 99215 OFFICE O/P EST HI 40 MIN: CPT

## 2023-11-06 ENCOUNTER — TRANSCRIPTION ENCOUNTER (OUTPATIENT)
Age: 68
End: 2023-11-06

## 2023-11-13 ENCOUNTER — APPOINTMENT (OUTPATIENT)
Dept: HEART AND VASCULAR | Facility: CLINIC | Age: 68
End: 2023-11-13
Payer: MEDICARE

## 2023-11-13 PROCEDURE — 99214 OFFICE O/P EST MOD 30 MIN: CPT | Mod: 25,95

## 2023-11-16 ENCOUNTER — APPOINTMENT (OUTPATIENT)
Dept: CT IMAGING | Facility: HOSPITAL | Age: 68
End: 2023-11-16

## 2023-11-16 ENCOUNTER — OUTPATIENT (OUTPATIENT)
Dept: OUTPATIENT SERVICES | Facility: HOSPITAL | Age: 68
LOS: 1 days | End: 2023-11-16
Payer: MEDICARE

## 2023-11-16 DIAGNOSIS — Z98.890 OTHER SPECIFIED POSTPROCEDURAL STATES: Chronic | ICD-10-CM

## 2023-11-16 DIAGNOSIS — Z90.11 ACQUIRED ABSENCE OF RIGHT BREAST AND NIPPLE: Chronic | ICD-10-CM

## 2023-11-16 PROCEDURE — 71250 CT THORAX DX C-: CPT | Mod: 26,MH

## 2023-11-16 PROCEDURE — 71250 CT THORAX DX C-: CPT

## 2023-11-17 ENCOUNTER — TRANSCRIPTION ENCOUNTER (OUTPATIENT)
Age: 68
End: 2023-11-17

## 2023-11-17 LAB — MARFAN SYNDROME/TAAD SEQUENCING DEL/DUP: ABNORMAL

## 2023-11-20 ENCOUNTER — TRANSCRIPTION ENCOUNTER (OUTPATIENT)
Age: 68
End: 2023-11-20

## 2023-11-21 ENCOUNTER — TRANSCRIPTION ENCOUNTER (OUTPATIENT)
Age: 68
End: 2023-11-21

## 2023-11-22 ENCOUNTER — APPOINTMENT (OUTPATIENT)
Dept: CARDIOTHORACIC SURGERY | Facility: CLINIC | Age: 68
End: 2023-11-22
Payer: MEDICARE

## 2023-11-22 ENCOUNTER — APPOINTMENT (OUTPATIENT)
Dept: CARDIOTHORACIC SURGERY | Facility: CLINIC | Age: 68
End: 2023-11-22

## 2023-11-22 VITALS — WEIGHT: 100 LBS | BODY MASS INDEX: 19.63 KG/M2 | HEIGHT: 60 IN

## 2023-11-22 DIAGNOSIS — I71.21 ANEURYSM OF THE ASCENDING AORTA, WITHOUT RUPTURE: ICD-10-CM

## 2023-11-22 PROCEDURE — 99213 OFFICE O/P EST LOW 20 MIN: CPT | Mod: 95

## 2023-11-27 ENCOUNTER — NON-APPOINTMENT (OUTPATIENT)
Age: 68
End: 2023-11-27

## 2023-11-27 ENCOUNTER — TRANSCRIPTION ENCOUNTER (OUTPATIENT)
Age: 68
End: 2023-11-27

## 2023-11-30 ENCOUNTER — NON-APPOINTMENT (OUTPATIENT)
Age: 68
End: 2023-11-30

## 2023-12-05 ENCOUNTER — APPOINTMENT (OUTPATIENT)
Dept: HEART AND VASCULAR | Facility: CLINIC | Age: 68
End: 2023-12-05

## 2023-12-11 ENCOUNTER — APPOINTMENT (OUTPATIENT)
Dept: PULMONOLOGY | Facility: CLINIC | Age: 68
End: 2023-12-11
Payer: MEDICARE

## 2023-12-11 VITALS
SYSTOLIC BLOOD PRESSURE: 125 MMHG | HEART RATE: 99 BPM | BODY MASS INDEX: 19.63 KG/M2 | DIASTOLIC BLOOD PRESSURE: 75 MMHG | TEMPERATURE: 97.9 F | HEIGHT: 60 IN | WEIGHT: 100 LBS | OXYGEN SATURATION: 96 %

## 2023-12-11 DIAGNOSIS — J98.6 DISORDERS OF DIAPHRAGM: ICD-10-CM

## 2023-12-11 DIAGNOSIS — R91.1 SOLITARY PULMONARY NODULE: ICD-10-CM

## 2023-12-11 PROCEDURE — 94060 EVALUATION OF WHEEZING: CPT

## 2023-12-11 PROCEDURE — 94729 DIFFUSING CAPACITY: CPT

## 2023-12-11 PROCEDURE — ZZZZZ: CPT

## 2023-12-11 PROCEDURE — 94727 GAS DIL/WSHOT DETER LNG VOL: CPT

## 2023-12-11 PROCEDURE — 99213 OFFICE O/P EST LOW 20 MIN: CPT | Mod: 25

## 2024-01-24 NOTE — HISTORY OF PRESENT ILLNESS
[FreeTextEntry1] : Billy Santiago is a 68 y/o F, former smoker, w/ a PMHx of CAD, HLD, GERD, pulmonary nodule, SOBE, left temporal artery aneurysm s/p ligation, and right breast cancer s/p mastectomy (no chemo/xrt). She has an extensive cardiac hx of an ascending aortic aneurysm, mitral valve repair, PFO closure, and tricuspid valve repair. Her family hx is notable of neoplasm of the bladder (father), TB (father), and ruptured aorta (mother).   She was referred by MD Stearns for an elevated R hemidiaphragm that has been closely followed since April 2023. During her most recent follow-up w/ MD Jain (Pulm) a CT chest in November 2023 (noted below) showed return of the diaphragm to a normal position. The SNIFF test prior to the CT also indicated appropriate motion. There was also found to be improvement in her PFTs (noted below). However her DLCO remained moderately reduced which needs to be further evaluated by cardiology with an ECHO.   CT Chest non-con 11/16/23:  Since 7/17/2023, there has been improvement in the now mild elevation of the right hemidiaphragm w/ improved aeration of the right middle and lower lobes and a decrease in the now small residual atelectasis in those lobes.   PFT 12/11/23: FVC             =  2.15   = 89% FEV1           =  1.49   = 82% FEV1/FVC   =  69      = 91% PEF             = 4.08    = 83% DLCO          = 10.06  = 54%    She presents today for a follow-up visit w/ CXR w/ findings noted below:  CXR 2/1/24: XXX  She reports.....

## 2024-01-24 NOTE — DATA REVIEWED
[FreeTextEntry1] : CTA C/A/P 7/17/23: -Increased moderate atelectasis of the right lung base and new right hemidiaphragm elevation. Patient central airways.   CXR 4/14/23: -Elevated right hemidiaphragm and right basilar atelectasis. No acute pulmonary disease.

## 2024-02-01 ENCOUNTER — OUTPATIENT (OUTPATIENT)
Dept: OUTPATIENT SERVICES | Facility: HOSPITAL | Age: 69
LOS: 1 days | End: 2024-02-01
Payer: MEDICARE

## 2024-02-01 ENCOUNTER — APPOINTMENT (OUTPATIENT)
Dept: THORACIC SURGERY | Facility: CLINIC | Age: 69
End: 2024-02-01
Payer: MEDICARE

## 2024-02-01 VITALS
TEMPERATURE: 97.7 F | SYSTOLIC BLOOD PRESSURE: 105 MMHG | WEIGHT: 100 LBS | HEIGHT: 60 IN | HEART RATE: 98 BPM | DIASTOLIC BLOOD PRESSURE: 71 MMHG | BODY MASS INDEX: 19.63 KG/M2 | OXYGEN SATURATION: 94 % | RESPIRATION RATE: 17 BRPM

## 2024-02-01 DIAGNOSIS — Z98.890 OTHER SPECIFIED POSTPROCEDURAL STATES: Chronic | ICD-10-CM

## 2024-02-01 DIAGNOSIS — Z90.11 ACQUIRED ABSENCE OF RIGHT BREAST AND NIPPLE: Chronic | ICD-10-CM

## 2024-02-01 PROCEDURE — 71046 X-RAY EXAM CHEST 2 VIEWS: CPT | Mod: 26

## 2024-02-01 PROCEDURE — 99213 OFFICE O/P EST LOW 20 MIN: CPT

## 2024-02-01 PROCEDURE — 71046 X-RAY EXAM CHEST 2 VIEWS: CPT

## 2024-02-01 NOTE — HISTORY OF PRESENT ILLNESS
[FreeTextEntry1] : Billy Santiago is a 68 y/o F, former smoker, w/ a PMHx of CAD, HLD, GERD, pulmonary nodule, SOBE, left temporal artery aneurysm s/p ligation, and right breast cancer s/p mastectomy (no chemo/xrt). She has an extensive cardiac hx of an ascending aortic aneurysm, mitral valve repair, PFO closure, and tricuspid valve repair. Her family hx is notable of neoplasm of the bladder (father), TB (father), and ruptured aorta (mother).  She was referred by MD Stearns for an elevated R hemidiaphragm that has been closely followed since April 2023. During her most recent follow-up w/ MD Jain (Pulm) a CT chest in November 2023 (noted below) showed return of the diaphragm to a normal position. The SNIFF test prior to the CT also indicated appropriate motion. There was also found to be improvement in her PFTs (noted below). However her DLCO remained moderately reduced which needs to be further evaluated by cardiology with an ECHO.  CT Chest non-con 11/16/23:  Since 7/17/2023, there has been improvement in the now mild elevation of the right hemidiaphragm w/ improved aeration of the right middle and lower lobes and a decrease in the now small residual atelectasis in those lobes.  PFT 12/11/23: FVC  = 2.15 = 89% FEV1  = 1.49 = 82% FEV1/FVC = 69 = 91% PEF  = 4.08 = 83% DLCO  = 10.06 = 54%  She presents today for a follow-up visit w/ CXR w/ findings noted below:  CXR 2/1/24: Improved aeration of right lung   She reports feeling better with improved breathing capacity.

## 2024-02-01 NOTE — DATA REVIEWED
[FreeTextEntry1] : CTA C/A/P 7/17/23: -Increased moderate atelectasis of the right lung base and new right hemidiaphragm elevation. Patent central airways.   CXR 4/14/23: -Elevated right hemidiaphragm and right basilar atelectasis. No acute pulmonary disease.

## 2024-02-01 NOTE — ASSESSMENT
[FreeTextEntry1] : Billy Santiago is a 70 y/o F, former smoker, w/ a PMHx of CAD, HLD, GERD, pulmonary nodule, SOBE, left temporal artery aneurysm s/p ligation, and right breast cancer s/p mastectomy (no chemo/xrt). She has an extensive cardiac hx of an ascending aortic aneurysm, mitral valve repair, PFO closure, and tricuspid valve repair. Her family hx is notable of neoplasm of the bladder (father), TB (father), and ruptured aorta (mother).  She was referred by MD Stearns for an elevated R hemidiaphragm that has been closely followed since April 2023. During her most recent follow-up w/ MD Jain (Pulm) a CT chest in November 2023 (noted below) showed return of the diaphragm to a normal position. The SNIFF test prior to the CT also indicated appropriate motion. There was also found to be improvement in her PFTs (noted below). However her DLCO remained moderately reduced which needs to be further evaluated by cardiology with an ECHO.  CT Chest non-con 11/16/23:  Since 7/17/2023, there has been improvement in the now mild elevation of the right hemidiaphragm w/ improved aeration of the right middle and lower lobes and a decrease in the now small residual atelectasis in those lobes.  PFT 12/11/23: FVC  = 2.15 = 89% FEV1  = 1.49 = 82% FEV1/FVC = 69 = 91% PEF  = 4.08 = 83% DLCO  = 10.06 = 54%  She presents today for a follow-up visit w/ CXR w/ findings noted below:  CXR 2/1/24: Improved aeration of right lung   She reports feeling better with improved breathing capacity. Her chest imaging including her CXR and chest CT from 11/23 were reviewed and shows improved aeration of her lungs indicating recovery of her phrenic nerve function. Hence, no thoracic surgical intervention is warranted. She will followup with us as needed.  Plan: Followup PRN  I, Dr. Charlotte Martin MD, personally performed the evaluation and management (E/M) services for this established patient who presents today with (a) new problem(s)/exacerbation of (an) existing condition(s).  That E/M includes conducting the clinically appropriate interval history &/or exam, assessing all new/exacerbated conditions, and establishing a new plan of care. I have also independently reviewed the medical records and imaging at the time of this office visit, and discussed the above mentioned images with interpretations with the patient. Today, my ALAINA was here to observe &/or participate in the visit & follow plan of care established by me.  Total time of 20 minutes with > 50% spent with the patient discussing radiologic studies and signs of improved phrenic nerve function.

## 2024-02-13 ENCOUNTER — NON-APPOINTMENT (OUTPATIENT)
Age: 69
End: 2024-02-13

## 2024-02-14 ENCOUNTER — TRANSCRIPTION ENCOUNTER (OUTPATIENT)
Age: 69
End: 2024-02-14

## 2024-02-15 ENCOUNTER — NON-APPOINTMENT (OUTPATIENT)
Age: 69
End: 2024-02-15

## 2024-02-16 ENCOUNTER — NON-APPOINTMENT (OUTPATIENT)
Age: 69
End: 2024-02-16

## 2024-02-16 ENCOUNTER — TRANSCRIPTION ENCOUNTER (OUTPATIENT)
Age: 69
End: 2024-02-16

## 2024-03-05 ENCOUNTER — APPOINTMENT (OUTPATIENT)
Dept: HEART AND VASCULAR | Facility: CLINIC | Age: 69
End: 2024-03-05
Payer: MEDICARE

## 2024-03-05 VITALS
BODY MASS INDEX: 19.63 KG/M2 | HEIGHT: 60 IN | SYSTOLIC BLOOD PRESSURE: 118 MMHG | WEIGHT: 100 LBS | DIASTOLIC BLOOD PRESSURE: 84 MMHG | OXYGEN SATURATION: 96 % | TEMPERATURE: 97.2 F | HEART RATE: 88 BPM

## 2024-03-05 PROCEDURE — 99214 OFFICE O/P EST MOD 30 MIN: CPT

## 2024-03-05 PROCEDURE — 36415 COLL VENOUS BLD VENIPUNCTURE: CPT

## 2024-03-05 PROCEDURE — 93000 ELECTROCARDIOGRAM COMPLETE: CPT

## 2024-03-05 PROCEDURE — G2211 COMPLEX E/M VISIT ADD ON: CPT

## 2024-03-05 NOTE — PHYSICAL EXAM
[Well Developed] : well developed [Normal] : well developed, well nourished, no acute distress [No Acute Distress] : no acute distress [Well Nourished] : well nourished

## 2024-03-05 NOTE — HISTORY OF PRESENT ILLNESS
[FreeTextEntry1] : 70 y/o female with h/o left temporal artery aneurysm s/p ligation, right breast cancer s/p mastectomy/LN dissection (no chemo/xrt), gerd, cad, hl, predm, mvp/mod-sev MR, family h/o early cvd, cad ,s/p MV/TV repair, closure pfo , s/p phrenic nerve paresis who presents for f/up today     last seen  had genetic testing - neg for connective tissue d/o  per pulm:  Recent CT of the chest in november shows return of diaphragm to normal position and SNIFF testing just pior to that showed appropriate motion. Given these two findings patient issue is resolved with improvement in her PFT. DLCO was found to be moderately reduced on PFT. Explained this is likely secondary to remodeling from her longstanding MR and recommended follow up with ECHO.  -CT chest non contrast 23 Ascending aorta mildly aneurysmal, measuring 4.1 cm. Descending aorta tortuous. Since 2023, there has been improvement in the now mild elevation of the right hemidiaphragm, with improved aeration of the right middle and lower lobes and a decrease in the now small residual atelectasis in those lobes.    notes some CASTRO persistent  no cp, lh, syncope, edema, orthopnea, pnd   -Holter monitor : SR, avg hr 95, 73 sve's, 117 pvc's  seen by Dr. Martin - phrenic nerve paresis seen by pulfara  seen by Dr. Stearns - for aneurysm screening     -Echo  CONCLUSIONS:  1. Normal left ventricular cavity size. The left ventricular wall thickness is normal. The left ventricular systolic function is normal with an ejection fraction visually estimated at 65 to 70 %. There are no regional wall motion abnormalities seen. 2. There is normal left ventricular diastolic function. 3. Normal right ventricular cavity size, normal right ventricular wall thickness and reduced systolic right ventricular function. The tricuspid annular plane systolic excursion (TAPSE) is 0.9 cm (normal >=1.7 cm). 4. Trace aortic regurgitation. 5. Dilated proximal ascending aorta. 6. No pericardial effusion seen. 7. S/P surgical mitral repair. The mean mitral gradient is 5mmHg. 8. Annuloplasty ring is noted in the tricuspid valve position. S/P surgical tricuspid repair.The mean tricuspid gradient is 9 which is elevated. No significant regurgitation. 9. Trace mitral regurgitation.   has family h/o asc ao aneurysm - mother/brother    underwent MV repair and TV repair, pfo closure w Dr. Devine at [facility]   course c/b mild transaminitis and pericarditis - started on colchicine  discharge echo  Echo discharge at [facility] /: normal lv size, ef 65%, trace ai, s/p mv repair, mean gradient 3, trace mr, s/p TV repair, mean gradient 2.5, trace    started on asa, statin    -CTA : calcium score 754, 96%, mild prox LAD, minimal mid LAD, minimal D2, mild prox LCx, mild mid LCx, minimal OM2, mild prox/mid RCA, thickening of MV leaflets w prolapse of post mitral leaflet   -MARCIO : normal lv size/fxn, MVP post leaflet, prolapse lateral P2 w max prolpase height 10mm, lesser degree prolapse central/medial P2, mod to severe MR, systolic flow reversal pulm veins c/w severe mr, EROA .53, RV 71, diffuse TVP w mild TR, trace ai, minimal plaque visualized portion aortic arch    family hx of asc ao aneurysm rupture/dissection    CT chest: : 4.9 mm solid nodule RML, stable mild centrilobular emphysematous changes with upper lobe predominance, mild to mod 3 vessel cad  ETT: : exercised 9.75 minutes to 104% mphr, no ekg changes  Echo : normal lv size/thickness, ef 65%, no wma, lae, mild ai, severe MVP posterior leaflet, mod to severe MR, trace tr, pasp 20, trace pr  Abd u/s: : no AAA    walks 3 miles/day  no mental health issues    on statin for past 2 years    PMH/PSH:  cad  predm  left temporal artery aneurysm s/p ligation   right breast cancer s/p mastectomy/LN dissection   gerd  osteoporosis  cad  constipation  hl  mvp/mod to severe MR  hl  lymphedema arm  pulm nodule  acl repair   breast mastectomy  s/p MV/TV repair, closure pfo   elevated right hemidiaphragm/phrenic nerve paresis      ALL:  nkda      MEDS:  asa 81 mg qd  lipitor 20 mg qhs  premarin vaginal cream  zoledronic acid      SH:  former smoker quit 2016, h/o 40 pack years  rare etoh  no drugs  single  live alone  no children  from [place]      FH:  mother -  aortic aneurysm, 64  father -  59 bladder cancer, MI 45  brother - 73's cabg, valve replacement  twin brother - 69, alive, asc ao aneurysm

## 2024-03-05 NOTE — DISCUSSION/SUMMARY
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 Descending aorta tortuous. Since 7/17/2023, there has been improvement in the now mild elevation of the right hemidiaphragm, with improved aeration of the right middle and lower lobes and a decrease in the now small residual atelectasis in those lobes.  -CT aorta 7/23: IMPRESSION: 1. No aortic dissection or aneurysm. Upper normal caliber ascending aorta measuring up to 3.8 cm. 2. Increased moderate atelectasis of the right lung base and new right hemidiaphragm elevation. Patent central airways 3. Dilated common bile duct measuring up to 10 mm. Outpatient follow-up with MRCP is suggested. severe cad, minimal ao calcifications  -Echo 7/23 CONCLUSIONS: 1. Normal left ventricular cavity size. The left ventricular wall thickness is normal. The left ventricular systolic function is normal with an ejection fraction visually estimated at 65 to 70 %. There are no regional wall motion abnormalities seen. 2. There is normal left ventricular diastolic function. 3. Normal right ventricular cavity size, normal right ventricular wall thickness and reduced systolic right ventricular function. The tricuspid annular plane systolic excursion (TAPSE) is 0.9 cm (normal >=1.7 cm). 4. Trace aortic regurgitation. 5. Dilated proximal ascending aorta. 6. No pericardial effusion seen. 7. S/P surgical mitral repair. The mean mitral gradient is 5mmHg. 8. Annuloplasty ring is noted in the tricuspid valve position. S/P surgical tricuspid repair.The mean tricuspid gradient is 9 which is elevated. No significant regurgitation. 9. Trace mitral regurgitation.  -Holter monitor 8/23: SR, avg hr 95, 73 sve's, 117 pvc's  -close monitoring blood sugar for predm  -f/up w Dr. Stearns for asc aorta aneurysm      -abx in future for preprocedures given mv/tv bands  -hold BB for now  -s/p MV/TV repair, pfo closure 4/23  -ekg ordered today - nsr, normal intervals, nsra  -Echo at [place] from discharge 4/14/23: normal lv size, ef 65%, trace ai, s/p mv repair, mean gradient 3, trace mr, s/p TV repair, mean gradient 2.5, trace tr  -CTA 1/23: calcium score 754, 96%, mild prox LAD, minimal mid LAD, minimal D2, mild prox LCx, mild mid LCx, minimal OM2, mild prox/mid RCA, thickening of MV leaflets w prolapse of post mitral leaflet  -thoracis f/up for phrenic nerve paresis. right hemidiaphragm elevation  -labs 2023 reviewed, labs ordered today  -[facility] 12/22: normal lv size/fxn, MVP post leaflet, prolapse lateral P2 w max prolpase height 10mm, lesser degree prolapse central/medial P2, mod to severe MR, systolic flow reversal pulm veins c/w severe mr, EROA.53, RV 71, diffuse TVP w mild TR, trace ai, minimal plaque visualized portion aortic arch  -ekg 12/22 - nsr, normal intervals, no st/t changes  -continue statin  -continue asa 81  -CT chest: 2022: 4.9 mm solid nodule RML, stable mild centrilobular emphysematous changes with upper lobe predominance, mild to mod 3 vessel cad  -ETT: 2021: exercised 9.75 minutes to 104% mphr, no ekg changes  -Echo 2021: normal lv size/thickness, ef 65%, no wma, lae, mild ai, severe MVP posterior leaflet, mod to severe MR, trace tr, pasp 20, trace pr  -serial echo's yearly next due 4/24  -Abd u/s: 11/21: no AAA  -pulm f/up for sob   -counseled on cvd risk factors  -f/up 2 months for cvd risk factors, sob    I have spent 30 minutes reviewing labs, records, tests and discussing cad, cvd risk factors, s/p mv/tv repair, cad, sob.

## 2024-03-06 LAB
ALBUMIN SERPL ELPH-MCNC: 4.6 G/DL
ALP BLD-CCNC: 60 U/L
ALT SERPL-CCNC: 31 U/L
ANION GAP SERPL CALC-SCNC: 13 MMOL/L
APPEARANCE: CLEAR
AST SERPL-CCNC: 29 U/L
BACTERIA: NEGATIVE /HPF
BASOPHILS # BLD AUTO: 0.05 K/UL
BASOPHILS NFR BLD AUTO: 0.9 %
BILIRUB SERPL-MCNC: 0.6 MG/DL
BILIRUBIN URINE: NEGATIVE
BLOOD URINE: NEGATIVE
BUN SERPL-MCNC: 17 MG/DL
CALCIUM SERPL-MCNC: 10 MG/DL
CAST: 1 /LPF
CHLORIDE SERPL-SCNC: 102 MMOL/L
CHOLEST SERPL-MCNC: 170 MG/DL
CO2 SERPL-SCNC: 24 MMOL/L
COLOR: YELLOW
CREAT SERPL-MCNC: 0.92 MG/DL
CREAT SPEC-SCNC: 41 MG/DL
EGFR: 67 ML/MIN/1.73M2
EOSINOPHIL # BLD AUTO: 0.08 K/UL
EOSINOPHIL NFR BLD AUTO: 1.4 %
EPITHELIAL CELLS: 20 /HPF
ESTIMATED AVERAGE GLUCOSE: 126 MG/DL
GLUCOSE QUALITATIVE U: NEGATIVE MG/DL
GLUCOSE SERPL-MCNC: 95 MG/DL
HBA1C MFR BLD HPLC: 6 %
HCT VFR BLD CALC: 42 %
HDLC SERPL-MCNC: 95 MG/DL
HGB BLD-MCNC: 14 G/DL
IMM GRANULOCYTES NFR BLD AUTO: 0.2 %
KETONES URINE: NEGATIVE MG/DL
LDLC SERPL CALC-MCNC: 66 MG/DL
LEUKOCYTE ESTERASE URINE: ABNORMAL
LYMPHOCYTES # BLD AUTO: 1.69 K/UL
LYMPHOCYTES NFR BLD AUTO: 29.8 %
MAN DIFF?: NORMAL
MCHC RBC-ENTMCNC: 30.9 PG
MCHC RBC-ENTMCNC: 33.3 GM/DL
MCV RBC AUTO: 92.7 FL
MICROALBUMIN 24H UR DL<=1MG/L-MCNC: <1.2 MG/DL
MICROALBUMIN/CREAT 24H UR-RTO: NORMAL MG/G
MICROSCOPIC-UA: NORMAL
MONOCYTES # BLD AUTO: 0.43 K/UL
MONOCYTES NFR BLD AUTO: 7.6 %
NEUTROPHILS # BLD AUTO: 3.42 K/UL
NEUTROPHILS NFR BLD AUTO: 60.1 %
NITRITE URINE: NEGATIVE
NONHDLC SERPL-MCNC: 75 MG/DL
PH URINE: 6.5
PLATELET # BLD AUTO: 206 K/UL
POTASSIUM SERPL-SCNC: 4.7 MMOL/L
PROT SERPL-MCNC: 7 G/DL
PROTEIN URINE: NEGATIVE MG/DL
RBC # BLD: 4.53 M/UL
RBC # FLD: 13.1 %
RED BLOOD CELLS URINE: 1 /HPF
SODIUM SERPL-SCNC: 138 MMOL/L
SPECIFIC GRAVITY URINE: 1.01
TRIGL SERPL-MCNC: 43 MG/DL
TSH SERPL-ACNC: 0.82 UIU/ML
UROBILINOGEN URINE: 0.2 MG/DL
WBC # FLD AUTO: 5.68 K/UL
WHITE BLOOD CELLS URINE: 12 /HPF

## 2024-03-07 LAB — APO LP(A) SERPL-MCNC: <9 NMOL/L

## 2024-03-13 ENCOUNTER — TRANSCRIPTION ENCOUNTER (OUTPATIENT)
Age: 69
End: 2024-03-13

## 2024-03-13 ENCOUNTER — APPOINTMENT (OUTPATIENT)
Dept: INTERNAL MEDICINE | Facility: CLINIC | Age: 69
End: 2024-03-13
Payer: MEDICARE

## 2024-03-13 DIAGNOSIS — R73.09 OTHER ABNORMAL GLUCOSE: ICD-10-CM

## 2024-03-13 PROCEDURE — 99421 OL DIG E/M SVC 5-10 MIN: CPT

## 2024-03-14 ENCOUNTER — TRANSCRIPTION ENCOUNTER (OUTPATIENT)
Age: 69
End: 2024-03-14

## 2024-03-15 VITALS
DIASTOLIC BLOOD PRESSURE: 65 MMHG | RESPIRATION RATE: 16 BRPM | SYSTOLIC BLOOD PRESSURE: 104 MMHG | HEIGHT: 60 IN | HEART RATE: 79 BPM | OXYGEN SATURATION: 96 % | TEMPERATURE: 98 F | WEIGHT: 98.99 LBS

## 2024-03-15 RX ORDER — CHLORHEXIDINE GLUCONATE 213 G/1000ML
1 SOLUTION TOPICAL ONCE
Refills: 0 | Status: DISCONTINUED | OUTPATIENT
Start: 2024-03-21 | End: 2024-04-04

## 2024-03-15 RX ORDER — ATORVASTATIN CALCIUM 80 MG/1
1 TABLET, FILM COATED ORAL
Qty: 0 | Refills: 0 | DISCHARGE

## 2024-03-15 RX ORDER — SODIUM CHLORIDE 9 MG/ML
1000 INJECTION INTRAMUSCULAR; INTRAVENOUS; SUBCUTANEOUS
Refills: 0 | Status: DISCONTINUED | OUTPATIENT
Start: 2024-03-21 | End: 2024-04-04

## 2024-03-15 NOTE — H&P ADULT - NSICDXFAMILYHX_GEN_ALL_CORE_FT
FAMILY HISTORY:  Father  Still living? Unknown  FH: aortic aneurysm, Age at diagnosis: Age Unknown    Mother  Still living? Unknown  FH: aortic aneurysm, Age at diagnosis: Age Unknown    Sibling  Still living? Unknown  FH: aortic aneurysm, Age at diagnosis: Age Unknown

## 2024-03-15 NOTE — H&P ADULT - HISTORY OF PRESENT ILLNESS
Cardiologist: Dr. Jo  Escort:  Pharmacy:    **CCTA = before MV/TV repair -- no recent stress tests/CCTA, unclear indication for cath (?symptoms)    68 yo F, former smoker, strong FH of CVD aneurysms dissections, CAD) with a PMH of HLD, MV/TV repair w/ PFO closure @ [facility] 4/2023 c/b mild transaminitis and pericarditis s/p colchicine treatment, known 4.1cm AAA (monitoring annually w/ Dr. Stearns), L temporal artery aneurysm s/p ligation in 2022, R breast CA s/p mastectomy/LN dissection (no chemo/radiation), GERD, preDM, known RML lung nodule and phrenic nerve paralysis (follows w/ pulm) who presented to outpatient cardiologist Dr. Jo with complaints of ___. Denies CP, SOB, dizziness, diaphoresis, palpitations, orthopnea/PND, hematuria, melena, LE swelling. Echo 7/2023: LVEF 65-70%, no WMA, dilated prox aorta, s/p MV repair w/ mild MR and TV repair. CCTA 1/2023: Ca score 754 (96th percentile), mild pLAD, minimal LAD/D2, mild pLCx/mLCx, OM2, p/mRCA. In light of patient's risk factors, CCS angina class ___ and elevated Ca score, patient now presents for cardiac catheterization with possible intervention if clinically indicated.  Cardiologist: Dr. Jo  Escort: Friend  Pharmacy: [facility]    **CCTA = before MV/TV repair -- no recent stress tests/CCTA, unclear indication for cath (?symptoms)    70 yo F, former smoker, strong FH of CVD aneurysms dissections, CAD) with a PMH of HLD, MV/TV repair w/ PFO closure @ [facility] 4/2023 c/b mild transaminitis and pericarditis s/p colchicine treatment, known 4.1cm AAA (monitoring annually w/ Dr. Stearns), L temporal artery aneurysm s/p ligation in 2022, R breast CA s/p mastectomy/LN dissection (no chemo/radiation), GERD, preDM, known RML lung nodule and phrenic nerve paralysis (follows w/ pulm) who presented to outpatient cardiologist Dr. Jo with complaints of SOB. Denies CP, SOB, dizziness, diaphoresis, palpitations, orthopnea/PND, hematuria, melena, LE swelling. Echo 7/2023: LVEF 65-70%, no WMA, dilated prox aorta, s/p MV repair w/ mild MR and TV repair. CCTA 1/2023: Ca score 754 (96th percentile), mild pLAD, minimal LAD/D2, mild pLCx/mLCx, OM2, p/mRCA. In light of patient's risk factors, CCS angina class II and elevated Ca score, patient now presents for cardiac catheterization with possible intervention if clinically indicated.  Cardiologist: Dr. Jo  Escort: Friend  Pharmacy: [facility]    70 yo F, former smoker, strong FH of CVD aneurysms dissections, CAD) with a PMH of HLD, MV/TV repair w/ PFO closure @ [facility] 4/2023 c/b mild transaminitis and pericarditis s/p colchicine treatment, known 4.1cm AAA (monitoring annually w/ Dr. Stearns), L temporal artery aneurysm s/p ligation in 2022, R breast CA s/p mastectomy/LN dissection (no chemo/radiation), GERD, preDM, known RML lung nodule and phrenic nerve paralysis (follows w/ pulm) who presented to outpatient cardiologist Dr. Jo with complaints of SOB. Denies CP, SOB, dizziness, diaphoresis, palpitations, orthopnea/PND, hematuria, melena, LE swelling. Echo 7/2023: LVEF 65-70%, no WMA, dilated prox aorta, s/p MV repair w/ mild MR and TV repair. CCTA 1/2023: Ca score 754 (96th percentile), mild pLAD, minimal LAD/D2, mild pLCx/mLCx, OM2, p/mRCA. In light of patient's risk factors, CCS angina class II and elevated Ca score, patient now presents for cardiac catheterization with possible intervention if clinically indicated.

## 2024-03-15 NOTE — H&P ADULT - NSHPLABSRESULTS_GEN_ALL_CORE
14.8   4.81  )-----------( 199      ( 21 Mar 2024 09:27 )             43.7                             EKG: NSR, LAD, cannot rule out anterior infarct

## 2024-03-15 NOTE — H&P ADULT - ASSESSMENT
68 yo F, former smoker, strong FH of CVD aneurysms dissections, CAD) with a PMH of HLD, MV/TV repair w/ PFO closure @ [facility] 4/2023 c/b mild transaminitis and pericarditis s/p colchicine treatment, known 4.1cm AAA (monitoring annually w/ Dr. Stearns), L temporal artery aneurysm s/p ligation in 2022, R breast CA s/p mastectomy/LN dissection (no chemo/radiation), GERD, preDM, known RML lung nodule and phrenic nerve paralysis (follows w/ pulm) who presented to outpatient cardiologist Dr. Jo with complaints of SOB. Denies CP, SOB, dizziness, diaphoresis, palpitations, orthopnea/PND, hematuria, melena, LE swelling. Echo 7/2023: LVEF 65-70%, no WMA, dilated prox aorta, s/p MV repair w/ mild MR and TV repair. CCTA 1/2023: Ca score 754 (96th percentile), mild pLAD, minimal LAD/D2, mild pLCx/mLCx, OM2, p/mRCA. In light of patient's risk factors, CCS angina class II and elevated Ca score, patient now presents for cardiac catheterization with possible intervention if clinically indicated.     -Pt H+H, platelets stable, Pt without any reports of BRBPR, hematuria, prior ICH, melena and no recent or previous GI bleed.   -Loaded with: [ ]81mg ASA, [ ]75mg Plavix,  [ ] ASA 325mg [ x] Plavix 600mg, [ ] No Load [ ]. due to patient reporting compliance with home aspirin with last dose today 3/21/24  -Pt Cr. stable- and LVEF 50%, pre cath fluids ordered per protocol [x]250ml IV bolus over 30 min, [ x] 75cc x2hrs, [ ] 50cc x2hrs, Patient euvolemic on exam.   -Malampatti II  -ASA III    Pt is a Candidate for Moderate Sedation, yes      Risks & benefits of procedure and alternative therapy have been explained to the patient including but not limited to: allergic reaction, bleeding w/possible need for blood transfusion, infection, renal and vascular compromise, limb damage, arrhythmia, stroke, vessel dissection/perforation, Myocardial infarction, emergent CABG. Informed consent obtained and in chart

## 2024-03-15 NOTE — H&P ADULT - NSICDXPASTMEDICALHX_GEN_ALL_CORE_FT
PAST MEDICAL HISTORY:  Aneurysm of left superficial temporal artery     Breast cancer right sided    GERD (gastroesophageal reflux disease)     HLD (hyperlipidemia)     Injury of phrenic nerve     MVP (mitral valve prolapse)

## 2024-03-15 NOTE — H&P ADULT - NSICDXPASTSURGICALHX_GEN_ALL_CORE_FT
PAST SURGICAL HISTORY:  H/O mastectomy, right     H/O mitral valve repair     H/O tricuspid valve repair     History of repair of ACL right    PFO (patent foramen ovale)

## 2024-03-21 ENCOUNTER — OUTPATIENT (OUTPATIENT)
Dept: OUTPATIENT SERVICES | Facility: HOSPITAL | Age: 69
LOS: 1 days | Discharge: HOME | End: 2024-03-21
Payer: MEDICARE

## 2024-03-21 DIAGNOSIS — Z90.11 ACQUIRED ABSENCE OF RIGHT BREAST AND NIPPLE: Chronic | ICD-10-CM

## 2024-03-21 DIAGNOSIS — Q21.12 PATENT FORAMEN OVALE: Chronic | ICD-10-CM

## 2024-03-21 DIAGNOSIS — Z98.890 OTHER SPECIFIED POSTPROCEDURAL STATES: Chronic | ICD-10-CM

## 2024-03-21 LAB
ALBUMIN SERPL ELPH-MCNC: 4.8 G/DL — SIGNIFICANT CHANGE UP (ref 3.3–5)
ALP SERPL-CCNC: 62 U/L — SIGNIFICANT CHANGE UP (ref 40–120)
ALT FLD-CCNC: 19 U/L — SIGNIFICANT CHANGE UP (ref 10–45)
ANION GAP SERPL CALC-SCNC: 12 MMOL/L — SIGNIFICANT CHANGE UP (ref 5–17)
APTT BLD: 31.2 SEC — SIGNIFICANT CHANGE UP (ref 24.5–35.6)
AST SERPL-CCNC: 25 U/L — SIGNIFICANT CHANGE UP (ref 10–40)
BASOPHILS # BLD AUTO: 0.04 K/UL — SIGNIFICANT CHANGE UP (ref 0–0.2)
BASOPHILS NFR BLD AUTO: 0.8 % — SIGNIFICANT CHANGE UP (ref 0–2)
BILIRUB SERPL-MCNC: 0.7 MG/DL — SIGNIFICANT CHANGE UP (ref 0.2–1.2)
BUN SERPL-MCNC: 15 MG/DL — SIGNIFICANT CHANGE UP (ref 7–23)
CALCIUM SERPL-MCNC: 11.1 MG/DL — HIGH (ref 8.4–10.5)
CHLORIDE SERPL-SCNC: 104 MMOL/L — SIGNIFICANT CHANGE UP (ref 96–108)
CHOLEST SERPL-MCNC: 186 MG/DL — SIGNIFICANT CHANGE UP
CK MB CFR SERPL CALC: 3.6 NG/ML — SIGNIFICANT CHANGE UP (ref 0–6.7)
CK SERPL-CCNC: 130 U/L — SIGNIFICANT CHANGE UP (ref 25–170)
CO2 SERPL-SCNC: 27 MMOL/L — SIGNIFICANT CHANGE UP (ref 22–31)
CREAT SERPL-MCNC: 0.92 MG/DL — SIGNIFICANT CHANGE UP (ref 0.5–1.3)
EGFR: 67 ML/MIN/1.73M2 — SIGNIFICANT CHANGE UP
EOSINOPHIL # BLD AUTO: 0.15 K/UL — SIGNIFICANT CHANGE UP (ref 0–0.5)
EOSINOPHIL NFR BLD AUTO: 3.1 % — SIGNIFICANT CHANGE UP (ref 0–6)
GLUCOSE SERPL-MCNC: 100 MG/DL — HIGH (ref 70–99)
HCT VFR BLD CALC: 43.7 % — SIGNIFICANT CHANGE UP (ref 34.5–45)
HDLC SERPL-MCNC: 92 MG/DL — SIGNIFICANT CHANGE UP
HGB BLD-MCNC: 14.8 G/DL — SIGNIFICANT CHANGE UP (ref 11.5–15.5)
IMM GRANULOCYTES NFR BLD AUTO: 0.2 % — SIGNIFICANT CHANGE UP (ref 0–0.9)
INR BLD: 0.95 — SIGNIFICANT CHANGE UP (ref 0.85–1.18)
LIPID PNL WITH DIRECT LDL SERPL: 82 MG/DL — SIGNIFICANT CHANGE UP
LYMPHOCYTES # BLD AUTO: 1.67 K/UL — SIGNIFICANT CHANGE UP (ref 1–3.3)
LYMPHOCYTES # BLD AUTO: 34.7 % — SIGNIFICANT CHANGE UP (ref 13–44)
MAGNESIUM SERPL-MCNC: 2 MG/DL — SIGNIFICANT CHANGE UP (ref 1.6–2.6)
MCHC RBC-ENTMCNC: 30.8 PG — SIGNIFICANT CHANGE UP (ref 27–34)
MCHC RBC-ENTMCNC: 33.9 GM/DL — SIGNIFICANT CHANGE UP (ref 32–36)
MCV RBC AUTO: 90.9 FL — SIGNIFICANT CHANGE UP (ref 80–100)
MONOCYTES # BLD AUTO: 0.42 K/UL — SIGNIFICANT CHANGE UP (ref 0–0.9)
MONOCYTES NFR BLD AUTO: 8.7 % — SIGNIFICANT CHANGE UP (ref 2–14)
NEUTROPHILS # BLD AUTO: 2.52 K/UL — SIGNIFICANT CHANGE UP (ref 1.8–7.4)
NEUTROPHILS NFR BLD AUTO: 52.5 % — SIGNIFICANT CHANGE UP (ref 43–77)
NON HDL CHOLESTEROL: 94 MG/DL — SIGNIFICANT CHANGE UP
NRBC # BLD: 0 /100 WBCS — SIGNIFICANT CHANGE UP (ref 0–0)
PLATELET # BLD AUTO: 199 K/UL — SIGNIFICANT CHANGE UP (ref 150–400)
POTASSIUM SERPL-MCNC: 4.2 MMOL/L — SIGNIFICANT CHANGE UP (ref 3.5–5.3)
POTASSIUM SERPL-SCNC: 4.2 MMOL/L — SIGNIFICANT CHANGE UP (ref 3.5–5.3)
PROT SERPL-MCNC: 7.6 G/DL — SIGNIFICANT CHANGE UP (ref 6–8.3)
PROTHROM AB SERPL-ACNC: 10.9 SEC — SIGNIFICANT CHANGE UP (ref 9.5–13)
RBC # BLD: 4.81 M/UL — SIGNIFICANT CHANGE UP (ref 3.8–5.2)
RBC # FLD: 12.8 % — SIGNIFICANT CHANGE UP (ref 10.3–14.5)
SODIUM SERPL-SCNC: 143 MMOL/L — SIGNIFICANT CHANGE UP (ref 135–145)
TRIGL SERPL-MCNC: 65 MG/DL — SIGNIFICANT CHANGE UP
WBC # BLD: 4.81 K/UL — SIGNIFICANT CHANGE UP (ref 3.8–10.5)
WBC # FLD AUTO: 4.81 K/UL — SIGNIFICANT CHANGE UP (ref 3.8–10.5)

## 2024-03-21 PROCEDURE — 82550 ASSAY OF CK (CPK): CPT

## 2024-03-21 PROCEDURE — 99152 MOD SED SAME PHYS/QHP 5/>YRS: CPT

## 2024-03-21 PROCEDURE — 80061 LIPID PANEL: CPT

## 2024-03-21 PROCEDURE — 93454 CORONARY ARTERY ANGIO S&I: CPT

## 2024-03-21 PROCEDURE — 80053 COMPREHEN METABOLIC PANEL: CPT

## 2024-03-21 PROCEDURE — 82553 CREATINE MB FRACTION: CPT

## 2024-03-21 PROCEDURE — C1769: CPT

## 2024-03-21 PROCEDURE — C1894: CPT

## 2024-03-21 PROCEDURE — 93454 CORONARY ARTERY ANGIO S&I: CPT | Mod: 26

## 2024-03-21 PROCEDURE — 85610 PROTHROMBIN TIME: CPT

## 2024-03-21 PROCEDURE — C1887: CPT

## 2024-03-21 PROCEDURE — 36415 COLL VENOUS BLD VENIPUNCTURE: CPT

## 2024-03-21 PROCEDURE — 85730 THROMBOPLASTIN TIME PARTIAL: CPT

## 2024-03-21 PROCEDURE — 85025 COMPLETE CBC W/AUTO DIFF WBC: CPT

## 2024-03-21 PROCEDURE — 83735 ASSAY OF MAGNESIUM: CPT

## 2024-03-21 PROCEDURE — 93005 ELECTROCARDIOGRAM TRACING: CPT

## 2024-03-21 PROCEDURE — 93010 ELECTROCARDIOGRAM REPORT: CPT

## 2024-03-21 RX ORDER — ACETAMINOPHEN 500 MG
650 TABLET ORAL ONCE
Refills: 0 | Status: COMPLETED | OUTPATIENT
Start: 2024-03-21 | End: 2024-03-21

## 2024-03-21 RX ORDER — SODIUM CHLORIDE 9 MG/ML
1000 INJECTION INTRAMUSCULAR; INTRAVENOUS; SUBCUTANEOUS
Refills: 0 | Status: DISCONTINUED | OUTPATIENT
Start: 2024-03-21 | End: 2024-04-04

## 2024-03-21 RX ORDER — CLOPIDOGREL BISULFATE 75 MG/1
600 TABLET, FILM COATED ORAL ONCE
Refills: 0 | Status: COMPLETED | OUTPATIENT
Start: 2024-03-21 | End: 2024-03-21

## 2024-03-21 RX ORDER — FEXOFENADINE HCL 30 MG
1 TABLET ORAL
Refills: 0 | DISCHARGE

## 2024-03-21 RX ORDER — SODIUM CHLORIDE 9 MG/ML
250 INJECTION INTRAMUSCULAR; INTRAVENOUS; SUBCUTANEOUS ONCE
Refills: 0 | Status: COMPLETED | OUTPATIENT
Start: 2024-03-21 | End: 2024-03-21

## 2024-03-21 RX ORDER — ASPIRIN/CALCIUM CARB/MAGNESIUM 324 MG
81 TABLET ORAL ONCE
Refills: 0 | Status: DISCONTINUED | OUTPATIENT
Start: 2024-03-21 | End: 2024-03-21

## 2024-03-21 RX ORDER — ASPIRIN/CALCIUM CARB/MAGNESIUM 324 MG
0 TABLET ORAL
Refills: 0 | DISCHARGE

## 2024-03-21 RX ORDER — ATORVASTATIN CALCIUM 80 MG/1
1 TABLET, FILM COATED ORAL
Refills: 0 | DISCHARGE

## 2024-03-21 RX ADMIN — SODIUM CHLORIDE 500 MILLILITER(S): 9 INJECTION INTRAMUSCULAR; INTRAVENOUS; SUBCUTANEOUS at 10:15

## 2024-03-21 RX ADMIN — SODIUM CHLORIDE 75 MILLILITER(S): 9 INJECTION INTRAMUSCULAR; INTRAVENOUS; SUBCUTANEOUS at 10:15

## 2024-03-21 RX ADMIN — CLOPIDOGREL BISULFATE 600 MILLIGRAM(S): 75 TABLET, FILM COATED ORAL at 10:15

## 2024-03-21 RX ADMIN — Medication 650 MILLIGRAM(S): at 14:10

## 2024-03-21 RX ADMIN — SODIUM CHLORIDE 100 MILLILITER(S): 9 INJECTION INTRAMUSCULAR; INTRAVENOUS; SUBCUTANEOUS at 12:27

## 2024-03-21 NOTE — PROGRESS NOTE ADULT - SUBJECTIVE AND OBJECTIVE BOX
Interventional Cardiology PA SDA Discharge Note    Patient without complaints. Ambulated and voided without difficulties    Afebrile, VSS    Ext:    	Right  Radial :  no  hematoma,  no   bleeding, dressing; C/D/I      Pulses:    intact RAD 2+     A/P:  70 yo F, former smoker, strong FH of CVD aneurysms dissections, CAD) with a PMH of HLD, MV/TV repair w/ [facility] c/b mild transaminitis and pericarditis s/p colchicine treatment, known 4.1cm AAA (monitoring annually w/ Dr. Stearns), L temporal artery aneurysm s/p ligation in 2022, R breast CA s/p mastectomy/LN dissection (no chemo/radiation), GERD, preDM, known RML lung nodule and phrenic nerve paralysis (follows w/ pulm) who presented to outpatient cardiologist Dr. Jo with complaints of SOB. Denies CP, SOB, dizziness, diaphoresis, palpitations, orthopnea/PND, hematuria, melena, LE swelling. Echo 7/2023: LVEF 65-70%, no WMA, dilated prox aorta, s/p MV repair w/ mild MR and TV repair. CCTA 1/2023: Ca score 754 (96th percentile), mild pLAD, minimal LAD/D2, mild pLCx/mLCx, OM2, p/mRCA. In light of patient's risk factors, CCS angina class II and elevated Ca score, patient now presents for cardiac catheterization with possible intervention if clinicall        s/p cardiac cath 3/21/24 RRA: normal coronaries        1.	Stable for discharge as per attending Dr. PARTH Morel after bed rest, wrist stable and 30 minutes of ambulation.  2.	Follow-up with PMD/Cardiologist Jade  in 1-2 weeks  3.	Discharged forms signed and copies in chart

## 2024-03-22 PROBLEM — T14.8XXA OTHER INJURY OF UNSPECIFIED BODY REGION, INITIAL ENCOUNTER: Chronic | Status: ACTIVE | Noted: 2024-03-15

## 2024-03-22 PROBLEM — E78.5 HYPERLIPIDEMIA, UNSPECIFIED: Chronic | Status: ACTIVE | Noted: 2024-03-15

## 2024-03-22 PROBLEM — I72.8 ANEURYSM OF OTHER SPECIFIED ARTERIES: Chronic | Status: ACTIVE | Noted: 2024-03-15

## 2024-03-22 PROBLEM — K21.9 GASTRO-ESOPHAGEAL REFLUX DISEASE WITHOUT ESOPHAGITIS: Chronic | Status: ACTIVE | Noted: 2024-03-15

## 2024-03-25 ENCOUNTER — NON-APPOINTMENT (OUTPATIENT)
Age: 69
End: 2024-03-25

## 2024-03-25 DIAGNOSIS — I20.89 OTHER FORMS OF ANGINA PECTORIS: ICD-10-CM

## 2024-03-25 DIAGNOSIS — R94.39 ABNORMAL RESULT OF OTHER CARDIOVASCULAR FUNCTION STUDY: ICD-10-CM

## 2024-03-25 DIAGNOSIS — E78.5 HYPERLIPIDEMIA, UNSPECIFIED: ICD-10-CM

## 2024-03-26 ENCOUNTER — APPOINTMENT (OUTPATIENT)
Dept: PULMONOLOGY | Facility: CLINIC | Age: 69
End: 2024-03-26
Payer: MEDICARE

## 2024-03-26 VITALS
HEART RATE: 103 BPM | BODY MASS INDEX: 19.63 KG/M2 | SYSTOLIC BLOOD PRESSURE: 100 MMHG | OXYGEN SATURATION: 98 % | RESPIRATION RATE: 16 BRPM | TEMPERATURE: 97.3 F | DIASTOLIC BLOOD PRESSURE: 70 MMHG | HEIGHT: 60 IN | WEIGHT: 100 LBS

## 2024-03-26 PROCEDURE — 99214 OFFICE O/P EST MOD 30 MIN: CPT

## 2024-03-30 NOTE — HISTORY OF PRESENT ILLNESS
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  when originally tested her diffusion was low, was this due to pulmonary hypertension or just reduced volume?  i had writen a note to her cardiologist and she questions my communication with her.  she is here with many questions, the sort of questions that present themselves when a patient spends a bit too much time on the internet.  she is well.

## 2024-04-01 ENCOUNTER — APPOINTMENT (OUTPATIENT)
Dept: PULMONOLOGY | Facility: CLINIC | Age: 69
End: 2024-04-01
Payer: MEDICARE

## 2024-04-01 PROCEDURE — 94060 EVALUATION OF WHEEZING: CPT

## 2024-04-01 PROCEDURE — 94727 GAS DIL/WSHOT DETER LNG VOL: CPT

## 2024-04-01 PROCEDURE — 94729 DIFFUSING CAPACITY: CPT

## 2024-04-08 ENCOUNTER — APPOINTMENT (OUTPATIENT)
Dept: HEART AND VASCULAR | Facility: CLINIC | Age: 69
End: 2024-04-08
Payer: MEDICARE

## 2024-04-08 VITALS
HEIGHT: 60 IN | SYSTOLIC BLOOD PRESSURE: 103 MMHG | HEART RATE: 100 BPM | BODY MASS INDEX: 19.44 KG/M2 | DIASTOLIC BLOOD PRESSURE: 73 MMHG | OXYGEN SATURATION: 94 % | TEMPERATURE: 98 F | WEIGHT: 99 LBS

## 2024-04-08 PROCEDURE — G2211 COMPLEX E/M VISIT ADD ON: CPT

## 2024-04-08 PROCEDURE — 99214 OFFICE O/P EST MOD 30 MIN: CPT

## 2024-04-08 NOTE — HISTORY OF PRESENT ILLNESS
[FreeTextEntry1] : 68 y/o female with h/o left temporal artery aneurysm s/p ligation, right breast cancer s/p mastectomy/LN dissection (no chemo/xrt), gerd, cad, hl, predm, mvp/mod-sev MR, family h/o early cvd, cad ,s/p MV/TV repair, closure pfo , s/p phrenic nerve paresis who presents for f/up today   last seen 3/24 sent for [facility] echo pending this month   no cp, lh, syncope, edema, orthopnea, pnd notes some improvement in sob  -[facility] 3/24: mid LAD bridge, dRCA 30%, diffuse small vessel disease, ao root dilated   had genetic testing - neg for connective tissue d/o  per pulm: Recent CT of the chest in november shows return of diaphragm to normal position and SNIFF testing just pior to that showed appropriate motion. Given these two findings patient issue is resolved with improvement in her PFT. DLCO was found to be moderately reduced on PFT. Explained this is likely secondary to remodeling from her longstanding MR and recommended follow up with ECHO.  -CT chest non contrast 23 Ascending aorta mildly aneurysmal, measuring 4.1 cm. Descending aorta tortuous. Since 2023, there has been improvement in the now mild elevation of the right hemidiaphragm, with improved aeration of the right middle and lower lobes and a decrease in the now small residual atelectasis in those lobes.     -Holter monitor : SR, avg hr 95, 73 sve's, 117 pvc's  seen by Dr. Martin - phrenic nerve paresis seen by pulfara  seen by Dr. Stearns - for aneurysm screening   -Echo  CONCLUSIONS:  1. Normal left ventricular cavity size. The left ventricular wall thickness is normal. The left ventricular systolic function is normal with an ejection fraction visually estimated at 65 to 70 %. There are no regional wall motion abnormalities seen. 2. There is normal left ventricular diastolic function. 3. Normal right ventricular cavity size, normal right ventricular wall thickness and reduced systolic right ventricular function. The tricuspid annular plane systolic excursion (TAPSE) is 0.9 cm (normal >=1.7 cm). 4. Trace aortic regurgitation. 5. Dilated proximal ascending aorta. 6. No pericardial effusion seen. 7. S/P surgical mitral repair. The mean mitral gradient is 5mmHg. 8. Annuloplasty ring is noted in the tricuspid valve position. S/P surgical tricuspid repair.The mean tricuspid gradient is 9 which is elevated. No significant regurgitation. 9. Trace mitral regurgitation.   has family h/o asc ao aneurysm - mother/brother    underwent MV repair and TV repair, pfo closure w Dr. Devine at [place]   course c/b mild transaminitis and pericarditis - started on colchicine  discharge echo  Echo discharge at [place] /: normal lv size, ef 65%, trace ai, s/p mv repair, mean gradient 3, trace mr, s/p TV repair, mean gradient 2.5, trace    started on asa, statin    -CTA : calcium score 754, 96%, mild prox LAD, minimal mid LAD, minimal D2, mild prox LCx, mild mid LCx, minimal OM2, mild prox/mid RCA, thickening of MV leaflets w prolapse of post mitral leaflet   -[facility] : normal lv size/fxn, MVP post leaflet, prolapse lateral P2 w max prolpase height 10mm, lesser degree prolapse central/medial P2, mod to severe MR, systolic flow reversal pulm veins c/w severe mr, EROA .53, RV 71, diffuse TVP w mild TR, trace ai, minimal plaque visualized portion aortic arch    family hx of asc ao aneurysm rupture/dissection    CT chest: : 4.9 mm solid nodule RML, stable mild centrilobular emphysematous changes with upper lobe predominance, mild to mod 3 vessel cad  ETT: : exercised 9.75 minutes to 104% mphr, no ekg changes  Echo : normal lv size/thickness, ef 65%, no wma, lae, mild ai, severe MVP posterior leaflet, mod to severe MR, trace tr, pasp 20, trace pr  Abd u/s: : no AAA    walks 3 miles/day  no mental health issues    on statin for past 2 years    PMH/PSH:  cad  predm  left temporal artery aneurysm s/p ligation   right breast cancer s/p mastectomy/LN dissection   gerd  osteoporosis  cad  constipation  hl  mvp/mod to severe MR  hl  lymphedema arm  pulm nodule  acl repair   breast mastectomy  s/p MV/TV repair, closure pfo 4/23  elevated right hemidiaphragm/phrenic nerve paresis      ALL:  nkda      MEDS:  asa 81 mg qd  lipitor 20 mg qhs  premarin vaginal cream  zoledronic acid      SH:  former smoker quit , h/o 40 pack years  rare etoh  no drugs  single  live alone  no children  from [place]      FH:  mother -  aortic aneurysm, 64  father -  59 bladder cancer, MI 45  brother - 73's cabg, valve replacement  twin brother - 69, alive, asc ao aneurysm

## 2024-04-08 NOTE — DISCUSSION/SUMMARY
[Patient] : the patient [___ Month(s)] : in [unfilled] month(s) [FreeTextEntry1] : 68 y/o female with h/o left temporal artery aneurysm s/p ligation, right breast cancer s/p mastectomy/LN dissection (no chemo/xrt), gerd, cad, hl, predm, mvp/mod-sev MR, family h/o early cvd, s/p MV/TV repair, pfo closure 4/23, phrenic nerve paresis who presents for f/up today    -[facility] 3/24: mid LAD bridge, dRCA 30%, diffuse small vessel disease, ao root dilated  -ordered echo to assess valve/rangel - pending this month  -genetic testing - neg for connective tissue d/o  -CT chest non contrast 11/16/23 Ascending aorta mildly aneurysmal, measuring 4.1 cm. Descending aorta tortuous. Since 7/17/2023, there has been improvement in the now mild elevation of the right hemidiaphragm, with improved aeration of the right middle and lower lobes and a decrease in the now small residual atelectasis in those lobes.  -CT aorta 7/23: IMPRESSION: 1. No aortic dissection or aneurysm. Upper normal caliber ascending aorta measuring up to 3.8 cm. 2. Increased moderate atelectasis of the right lung base and new right hemidiaphragm elevation. Patent central airways 3. Dilated common bile duct measuring up to 10 mm. Outpatient follow-up with MRCP is suggested. severe cad, minimal ao calcifications  -Echo 7/23 CONCLUSIONS: 1. Normal left ventricular cavity size. The left ventricular wall thickness is normal. The left ventricular systolic function is normal with an ejection fraction visually estimated at 65 to 70 %. There are no regional wall motion abnormalities seen. 2. There is normal left ventricular diastolic function. 3. Normal right ventricular cavity size, normal right ventricular wall thickness and reduced systolic right ventricular function. The tricuspid annular plane systolic excursion (TAPSE) is 0.9 cm (normal >=1.7 cm). 4. Trace aortic regurgitation. 5. Dilated proximal ascending aorta. 6. No pericardial effusion seen. 7. S/P surgical mitral repair. The mean mitral gradient is 5mmHg. 8. Annuloplasty ring is noted in the tricuspid valve position. S/P surgical tricuspid repair.The mean tricuspid gradient is 9 which is elevated. No significant regurgitation. 9. Trace mitral regurgitation.  -Holter monitor 8/23: SR, avg hr 95, 73 sve's, 117 pvc's  -close monitoring blood sugar for predm  -f/up w Dr. Stearns for asc aorta aneurysm  -abx in future for preprocedures given mv/tv bands  -hold BB for now  -s/p MV/TV repair, pfo closure 4/23  -ekg 3/24 - nsr, normal intervals, nsra  -Echo at [place] from discharge 4/14/23: normal lv size, ef 65%, trace ai, s/p mv repair, mean gradient 3, trace mr, s/p TV repair, mean gradient 2.5, trace tr  -CTA 1/23: calcium score 754, 96%, mild prox LAD, minimal mid LAD, minimal D2, mild prox LCx, mild mid LCx, minimal OM2, mild prox/mid RCA, thickening of MV leaflets w prolapse of post mitral leaflet  -thoracis f/up for phrenic nerve paresis. right hemidiaphragm elevation  -labs 2024 reviewed   -[facility] 12/22: normal lv size/fxn, MVP post leaflet, prolapse lateral P2 w max prolpase height 10mm, lesser degree prolapse central/medial P2, mod to severe MR, systolic flow reversal pulm veins c/w severe mr, EROA.53, RV 71, diffuse TVP w mild TR, trace ai, minimal plaque visualized portion aortic arch  -ekg 12/22 - nsr, normal intervals, no st/t changes  -continue statin  -continue asa 81  -CT chest: 2022: 4.9 mm solid nodule RML, stable mild centrilobular emphysematous changes with upper lobe predominance, mild to mod 3 vessel cad  -ETT: 2021: exercised 9.75 minutes to 104% mphr, no ekg changes  -Echo 2021: normal lv size/thickness, ef 65%, no wma, lae, mild ai, severe MVP posterior leaflet, mod to severe MR, trace tr, pasp 20, trace pr  -serial echo's yearly next due 4/24  -Abd u/s: 11/21: no AAA  -pulm f/up for sob  -counseled on cvd risk factors  -f/up 6 months for cvd risk factors, sob    I have spent 30 minutes reviewing labs, records, tests and discussing cad, cvd risk factors, s/p mv/tv repair, cad, sob.

## 2024-04-18 ENCOUNTER — OUTPATIENT (OUTPATIENT)
Dept: OUTPATIENT SERVICES | Facility: HOSPITAL | Age: 69
LOS: 1 days | End: 2024-04-18
Payer: MEDICARE

## 2024-04-18 ENCOUNTER — RESULT REVIEW (OUTPATIENT)
Age: 69
End: 2024-04-18

## 2024-04-18 DIAGNOSIS — Z90.11 ACQUIRED ABSENCE OF RIGHT BREAST AND NIPPLE: Chronic | ICD-10-CM

## 2024-04-18 DIAGNOSIS — Z98.890 OTHER SPECIFIED POSTPROCEDURAL STATES: Chronic | ICD-10-CM

## 2024-04-18 DIAGNOSIS — R06.02 SHORTNESS OF BREATH: ICD-10-CM

## 2024-04-18 DIAGNOSIS — I25.10 ATHEROSCLEROTIC HEART DISEASE OF NATIVE CORONARY ARTERY WITHOUT ANGINA PECTORIS: ICD-10-CM

## 2024-04-18 DIAGNOSIS — Q21.12 PATENT FORAMEN OVALE: Chronic | ICD-10-CM

## 2024-04-18 PROCEDURE — 93306 TTE W/DOPPLER COMPLETE: CPT | Mod: 26

## 2024-04-18 PROCEDURE — 93306 TTE W/DOPPLER COMPLETE: CPT

## 2024-04-22 ENCOUNTER — TRANSCRIPTION ENCOUNTER (OUTPATIENT)
Age: 69
End: 2024-04-22

## 2024-04-30 ENCOUNTER — RX RENEWAL (OUTPATIENT)
Age: 69
End: 2024-04-30

## 2024-04-30 RX ORDER — ATORVASTATIN CALCIUM 20 MG/1
20 TABLET, FILM COATED ORAL
Qty: 90 | Refills: 1 | Status: ACTIVE | COMMUNITY
Start: 2022-03-20 | End: 1900-01-01

## 2024-05-01 ENCOUNTER — TRANSCRIPTION ENCOUNTER (OUTPATIENT)
Age: 69
End: 2024-05-01

## 2024-05-02 ENCOUNTER — TRANSCRIPTION ENCOUNTER (OUTPATIENT)
Age: 69
End: 2024-05-02

## 2024-07-24 ENCOUNTER — RX RENEWAL (OUTPATIENT)
Age: 69
End: 2024-07-24

## 2024-07-24 ENCOUNTER — TRANSCRIPTION ENCOUNTER (OUTPATIENT)
Age: 69
End: 2024-07-24

## 2024-07-25 ENCOUNTER — TRANSCRIPTION ENCOUNTER (OUTPATIENT)
Age: 69
End: 2024-07-25

## 2024-07-29 ENCOUNTER — TRANSCRIPTION ENCOUNTER (OUTPATIENT)
Age: 69
End: 2024-07-29

## 2024-07-31 ENCOUNTER — NON-APPOINTMENT (OUTPATIENT)
Age: 69
End: 2024-07-31

## 2024-08-26 ENCOUNTER — TRANSCRIPTION ENCOUNTER (OUTPATIENT)
Age: 69
End: 2024-08-26

## 2024-08-29 ENCOUNTER — TRANSCRIPTION ENCOUNTER (OUTPATIENT)
Age: 69
End: 2024-08-29

## 2024-08-29 ENCOUNTER — APPOINTMENT (OUTPATIENT)
Dept: INTERNAL MEDICINE | Facility: CLINIC | Age: 69
End: 2024-08-29
Payer: MEDICARE

## 2024-08-29 DIAGNOSIS — Z71.85 ENCOUNTER FOR IMMUNIZATION SAFETY COUNSELING: ICD-10-CM

## 2024-08-29 PROCEDURE — 99421 OL DIG E/M SVC 5-10 MIN: CPT

## 2024-08-31 ENCOUNTER — TRANSCRIPTION ENCOUNTER (OUTPATIENT)
Age: 69
End: 2024-08-31

## 2024-09-03 ENCOUNTER — APPOINTMENT (OUTPATIENT)
Dept: INTERNAL MEDICINE | Facility: CLINIC | Age: 69
End: 2024-09-03
Payer: MEDICARE

## 2024-09-03 VITALS
TEMPERATURE: 98.2 F | HEIGHT: 60 IN | SYSTOLIC BLOOD PRESSURE: 95 MMHG | OXYGEN SATURATION: 97 % | DIASTOLIC BLOOD PRESSURE: 64 MMHG | HEART RATE: 98 BPM | WEIGHT: 96 LBS | BODY MASS INDEX: 18.85 KG/M2 | RESPIRATION RATE: 16 BRPM

## 2024-09-03 VITALS
DIASTOLIC BLOOD PRESSURE: 64 MMHG | HEART RATE: 98 BPM | OXYGEN SATURATION: 96 % | BODY MASS INDEX: 18.85 KG/M2 | TEMPERATURE: 98.2 F | HEIGHT: 60 IN | SYSTOLIC BLOOD PRESSURE: 95 MMHG | WEIGHT: 96 LBS

## 2024-09-03 DIAGNOSIS — R06.02 SHORTNESS OF BREATH: ICD-10-CM

## 2024-09-03 DIAGNOSIS — Z78.9 OTHER SPECIFIED HEALTH STATUS: ICD-10-CM

## 2024-09-03 DIAGNOSIS — Z00.00 ENCOUNTER FOR GENERAL ADULT MEDICAL EXAMINATION W/OUT ABNORMAL FINDINGS: ICD-10-CM

## 2024-09-03 DIAGNOSIS — Z87.898 PERSONAL HISTORY OF OTHER SPECIFIED CONDITIONS: ICD-10-CM

## 2024-09-03 DIAGNOSIS — Z01.00 ENCOUNTER FOR EXAMINATION OF EYES AND VISION W/OUT ABNORMAL FINDINGS: ICD-10-CM

## 2024-09-03 DIAGNOSIS — Z86.69 PERSONAL HISTORY OF OTHER DISEASES OF THE NERVOUS SYSTEM AND SENSE ORGANS: ICD-10-CM

## 2024-09-03 DIAGNOSIS — R35.1 NOCTURIA: ICD-10-CM

## 2024-09-03 PROCEDURE — 36415 COLL VENOUS BLD VENIPUNCTURE: CPT

## 2024-09-03 PROCEDURE — G0439: CPT

## 2024-09-03 RX ORDER — FAMOTIDINE 10 MG/1
TABLET, FILM COATED ORAL
Refills: 0 | Status: ACTIVE | COMMUNITY

## 2024-09-03 RX ORDER — MUPIROCIN 20 MG/G
2 OINTMENT TOPICAL
Qty: 22 | Refills: 0 | Status: COMPLETED | COMMUNITY
Start: 2024-08-01

## 2024-09-03 NOTE — HEALTH RISK ASSESSMENT
[Patient reported mammogram was normal] : Patient reported mammogram was normal [Patient reported colonoscopy was normal] : Patient reported colonoscopy was normal [HIV test declined] : HIV test declined [Hepatitis C test declined] : Hepatitis C test declined [None] : None [Feels Safe at Home] : Feels safe at home [Good] : ~his/her~  mood as  good [Yes] : Yes [Monthly or less (1 pt)] : Monthly or less (1 point) [1 or 2 (0 pts)] : 1 or 2 (0 points) [Never (0 pts)] : Never (0 points) [No] : In the past 12 months have you used drugs other than those required for medical reasons? No [No falls in past year] : Patient reported no falls in the past year [0] : 2) Feeling down, depressed, or hopeless: Not at all (0) [PHQ-2 Negative - No further assessment needed] : PHQ-2 Negative - No further assessment needed [Audit-CScore] : 1 [[id number]] : 0 [10-14] : 10-14 [> 15 Years] : > 15 Years [NO] : No [Change in mental status noted] : No change in mental status noted [Language] : denies difficulty with language [Behavior] : denies difficulty with behavior [Learning/Retaining New Information] : denies difficulty learning/retaining new information [Handling Complex Tasks] : denies difficulty handling complex tasks [Reasoning] : denies difficulty with reasoning [Spatial Ability and Orientation] : denies difficulty with spatial ability and orientation [Fully functional (bathing, dressing, toileting, transferring, walking, feeding)] : Fully functional (bathing, dressing, toileting, transferring, walking, feeding) [Fully functional (using the telephone, shopping, preparing meals, housekeeping, doing laundry, using] : Fully functional and needs no help or supervision to perform IADLs (using the telephone, shopping, preparing meals, housekeeping, doing laundry, using transportation, managing medications and managing finances) [Reports changes in hearing] : Reports no changes in hearing [Reports changes in vision] : Reports no changes in vision [Reports changes in dental health] : Reports no changes in dental health [Smoke Detector] : smoke detector [Safety elements used in home] : safety elements used in home [Seat Belt] :  uses seat belt [MammogramDate] : 01/24 [BoneDensityDate] : 2024 [ColonoscopyDate] : 12/19

## 2024-09-03 NOTE — HISTORY OF PRESENT ILLNESS
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 Feels "Well". Feels "jose m". UTD with HCm and vaccines.  Takes PEPCID nightly for GERD.  Sees cards regularly.  May have eyelid surgery-requesting PTT but too soon to preop.  c/o nocturia but no daytime symptoms. On topical E2,    last seen 3/24 sent for [facility] echo pending this month   no cp, lh, syncope, edema, orthopnea, pnd notes some improvement in sob  -[facility] 3/24: mid LAD bridge, dRCA 30%, diffuse small vessel disease, ao root dilated   had genetic testing - neg for connective tissue d/o

## 2024-09-03 NOTE — ASSESSMENT
[FreeTextEntry1] : 69 year is here for Medicare annual wellness exam. her cognitive function was normal. Please refer to an appropriate section above for a list of providers that are regularly involved in the patient's care. See above for full details of history and physical. The patient's care team was reviewed and documented above. Listed above are the preventative services for which the patient may be due. I informed the patient with a list and offered assistance in scheduling the appropriate exams. Labs sent including urine and coags. Too soon to preop. Due for GYN and otherwise UTD

## 2024-09-03 NOTE — PHYSICAL EXAM
[No Acute Distress] : no acute distress [Well Nourished] : well nourished [Well Developed] : well developed [Well-Appearing] : well-appearing [Normal Sclera/Conjunctiva] : normal sclera/conjunctiva [PERRL] : pupils equal round and reactive to light [EOMI] : extraocular movements intact [Normal Outer Ear/Nose] : the outer ears and nose were normal in appearance [Normal Oropharynx] : the oropharynx was normal [No JVD] : no jugular venous distention [No Lymphadenopathy] : no lymphadenopathy [Supple] : supple [Thyroid Normal, No Nodules] : the thyroid was normal and there were no nodules present [No Respiratory Distress] : no respiratory distress  [No Accessory Muscle Use] : no accessory muscle use [Clear to Auscultation] : lungs were clear to auscultation bilaterally [Normal Rate] : normal rate  [Regular Rhythm] : with a regular rhythm [Normal S1, S2] : normal S1 and S2 [No Murmur] : no murmur heard [No Carotid Bruits] : no carotid bruits [No Abdominal Bruit] : a ~M bruit was not heard ~T in the abdomen [No Varicosities] : no varicosities [Pedal Pulses Present] : the pedal pulses are present [No Edema] : there was no peripheral edema [No Palpable Aorta] : no palpable aorta [No Extremity Clubbing/Cyanosis] : no extremity clubbing/cyanosis [Soft] : abdomen soft [Non Tender] : non-tender [Non-distended] : non-distended [No Masses] : no abdominal mass palpated [No HSM] : no HSM [Normal Bowel Sounds] : normal bowel sounds [Normal Posterior Cervical Nodes] : no posterior cervical lymphadenopathy [Normal Anterior Cervical Nodes] : no anterior cervical lymphadenopathy [No CVA Tenderness] : no CVA  tenderness [No Spinal Tenderness] : no spinal tenderness [No Joint Swelling] : no joint swelling [Grossly Normal Strength/Tone] : grossly normal strength/tone [No Rash] : no rash [Coordination Grossly Intact] : coordination grossly intact [No Focal Deficits] : no focal deficits [Normal Gait] : normal gait [Deep Tendon Reflexes (DTR)] : deep tendon reflexes were 2+ and symmetric [Normal Affect] : the affect was normal [Normal Insight/Judgement] : insight and judgment were intact [Alert and Oriented x3] : oriented to person, place, and time [de-identified] : s/p R masectomy, L breast WNL

## 2024-09-03 NOTE — REVIEW OF SYSTEMS
[Negative] : Heme/Lymph [Dysuria] : no dysuria [Incontinence] : no incontinence [Nocturia] : nocturia [Poor Libido] : libido not poor [Hematuria] : no hematuria [Frequency] : no frequency [Dysmenorrhea] : no dysmenorrhea

## 2024-09-04 LAB
25(OH)D3 SERPL-MCNC: 72.7 NG/ML
ALBUMIN SERPL ELPH-MCNC: 4.7 G/DL
ALP BLD-CCNC: 63 U/L
ALT SERPL-CCNC: 18 U/L
ANION GAP SERPL CALC-SCNC: 12 MMOL/L
APPEARANCE: CLEAR
APTT BLD: 32.5 SEC
AST SERPL-CCNC: 23 U/L
BACTERIA: NEGATIVE /HPF
BASOPHILS # BLD AUTO: 0.05 K/UL
BASOPHILS NFR BLD AUTO: 1 %
BILIRUB SERPL-MCNC: 0.6 MG/DL
BILIRUBIN URINE: NEGATIVE
BLOOD URINE: NEGATIVE
BUN SERPL-MCNC: 27 MG/DL
CALCIUM SERPL-MCNC: 9.9 MG/DL
CAST: 0 /LPF
CHLORIDE SERPL-SCNC: 101 MMOL/L
CHOLEST SERPL-MCNC: 181 MG/DL
CO2 SERPL-SCNC: 25 MMOL/L
COLOR: YELLOW
CREAT SERPL-MCNC: 0.95 MG/DL
EGFR: 64 ML/MIN/1.73M2
EOSINOPHIL # BLD AUTO: 0.16 K/UL
EOSINOPHIL NFR BLD AUTO: 3.1 %
EPITHELIAL CELLS: 5 /HPF
ESTIMATED AVERAGE GLUCOSE: 120 MG/DL
GLUCOSE QUALITATIVE U: NEGATIVE MG/DL
GLUCOSE SERPL-MCNC: 94 MG/DL
HBA1C MFR BLD HPLC: 5.8 %
HCT VFR BLD CALC: 42.9 %
HDLC SERPL-MCNC: 97 MG/DL
HGB BLD-MCNC: 14.5 G/DL
IMM GRANULOCYTES NFR BLD AUTO: 0 %
INR PPP: 0.92 RATIO
KETONES URINE: NEGATIVE MG/DL
LDLC SERPL CALC-MCNC: 74 MG/DL
LEUKOCYTE ESTERASE URINE: NEGATIVE
LYMPHOCYTES # BLD AUTO: 1.8 K/UL
LYMPHOCYTES NFR BLD AUTO: 34.4 %
MAN DIFF?: NORMAL
MCHC RBC-ENTMCNC: 31.7 PG
MCHC RBC-ENTMCNC: 33.8 GM/DL
MCV RBC AUTO: 93.9 FL
MICROSCOPIC-UA: NORMAL
MONOCYTES # BLD AUTO: 0.41 K/UL
MONOCYTES NFR BLD AUTO: 7.8 %
NEUTROPHILS # BLD AUTO: 2.82 K/UL
NEUTROPHILS NFR BLD AUTO: 53.7 %
NITRITE URINE: NEGATIVE
NONHDLC SERPL-MCNC: 84 MG/DL
PH URINE: 6
PLATELET # BLD AUTO: 197 K/UL
POTASSIUM SERPL-SCNC: 4.3 MMOL/L
PROT SERPL-MCNC: 7.1 G/DL
PROTEIN URINE: NEGATIVE MG/DL
PT BLD: 10.4 SEC
RBC # BLD: 4.57 M/UL
RBC # FLD: 12.1 %
RED BLOOD CELLS URINE: 2 /HPF
SODIUM SERPL-SCNC: 138 MMOL/L
SPECIFIC GRAVITY URINE: 1.02
TRIGL SERPL-MCNC: 50 MG/DL
TSH SERPL-ACNC: 1.06 UIU/ML
UROBILINOGEN URINE: 0.2 MG/DL
VIT B12 SERPL-MCNC: 455 PG/ML
WBC # FLD AUTO: 5.24 K/UL
WHITE BLOOD CELLS URINE: 1 /HPF

## 2024-10-08 ENCOUNTER — NON-APPOINTMENT (OUTPATIENT)
Age: 69
End: 2024-10-08

## 2024-10-08 ENCOUNTER — APPOINTMENT (OUTPATIENT)
Dept: HEART AND VASCULAR | Facility: CLINIC | Age: 69
End: 2024-10-08
Payer: MEDICARE

## 2024-10-08 VITALS
HEIGHT: 60 IN | HEART RATE: 90 BPM | OXYGEN SATURATION: 95 % | WEIGHT: 98 LBS | TEMPERATURE: 98.1 F | DIASTOLIC BLOOD PRESSURE: 70 MMHG | SYSTOLIC BLOOD PRESSURE: 105 MMHG | BODY MASS INDEX: 19.24 KG/M2

## 2024-10-08 PROCEDURE — 36415 COLL VENOUS BLD VENIPUNCTURE: CPT

## 2024-10-08 PROCEDURE — G2211 COMPLEX E/M VISIT ADD ON: CPT

## 2024-10-08 PROCEDURE — 93000 ELECTROCARDIOGRAM COMPLETE: CPT

## 2024-10-08 PROCEDURE — 99214 OFFICE O/P EST MOD 30 MIN: CPT

## 2024-10-08 RX ORDER — FEXOFENADINE HYDROCHLORIDE AND PSEUDOEPHEDRINE HYDROCHLORIDE 180; 240 MG/1; MG/1
TABLET, FILM COATED, EXTENDED RELEASE ORAL
Refills: 0 | Status: ACTIVE | COMMUNITY

## 2024-10-09 LAB
CHOLEST SERPL-MCNC: 172 MG/DL
HDLC SERPL-MCNC: 87 MG/DL
LDLC SERPL CALC-MCNC: 74 MG/DL
NONHDLC SERPL-MCNC: 84 MG/DL
TRIGL SERPL-MCNC: 48 MG/DL

## 2024-10-16 DIAGNOSIS — Z87.891 PERSONAL HISTORY OF NICOTINE DEPENDENCE: ICD-10-CM

## 2024-10-25 ENCOUNTER — RESULT REVIEW (OUTPATIENT)
Age: 69
End: 2024-10-25

## 2024-10-25 ENCOUNTER — OUTPATIENT (OUTPATIENT)
Dept: OUTPATIENT SERVICES | Facility: HOSPITAL | Age: 69
LOS: 1 days | End: 2024-10-25
Payer: MEDICARE

## 2024-10-25 ENCOUNTER — APPOINTMENT (OUTPATIENT)
Dept: CT IMAGING | Facility: HOSPITAL | Age: 69
End: 2024-10-25
Payer: MEDICARE

## 2024-10-25 DIAGNOSIS — Z98.890 OTHER SPECIFIED POSTPROCEDURAL STATES: Chronic | ICD-10-CM

## 2024-10-25 DIAGNOSIS — Z90.11 ACQUIRED ABSENCE OF RIGHT BREAST AND NIPPLE: Chronic | ICD-10-CM

## 2024-10-25 DIAGNOSIS — Q21.12 PATENT FORAMEN OVALE: Chronic | ICD-10-CM

## 2024-10-25 LAB — POCT ISTAT CREATININE: 1.1 MG/DL — SIGNIFICANT CHANGE UP (ref 0.5–1.3)

## 2024-10-25 PROCEDURE — 71275 CT ANGIOGRAPHY CHEST: CPT | Mod: 26,MB

## 2024-11-06 ENCOUNTER — APPOINTMENT (OUTPATIENT)
Dept: CARDIOTHORACIC SURGERY | Facility: CLINIC | Age: 69
End: 2024-11-06

## 2024-11-06 ENCOUNTER — APPOINTMENT (OUTPATIENT)
Dept: CARDIOTHORACIC SURGERY | Facility: CLINIC | Age: 69
End: 2024-11-06
Payer: MEDICARE

## 2024-11-06 VITALS
DIASTOLIC BLOOD PRESSURE: 65 MMHG | BODY MASS INDEX: 19.24 KG/M2 | WEIGHT: 98 LBS | OXYGEN SATURATION: 96 % | HEART RATE: 83 BPM | SYSTOLIC BLOOD PRESSURE: 94 MMHG | TEMPERATURE: 97.3 F | HEIGHT: 60 IN

## 2024-11-06 DIAGNOSIS — I71.21 ANEURYSM OF THE ASCENDING AORTA, WITHOUT RUPTURE: ICD-10-CM

## 2024-11-06 PROCEDURE — 99214 OFFICE O/P EST MOD 30 MIN: CPT

## 2024-11-12 ENCOUNTER — TRANSCRIPTION ENCOUNTER (OUTPATIENT)
Age: 69
End: 2024-11-12

## 2024-11-12 ENCOUNTER — NON-APPOINTMENT (OUTPATIENT)
Age: 69
End: 2024-11-12

## 2024-11-20 PROCEDURE — 71275 CT ANGIOGRAPHY CHEST: CPT | Mod: MB

## 2024-11-20 PROCEDURE — 82565 ASSAY OF CREATININE: CPT

## 2024-12-02 ENCOUNTER — APPOINTMENT (OUTPATIENT)
Dept: PULMONOLOGY | Facility: CLINIC | Age: 69
End: 2024-12-02
Payer: MEDICARE

## 2024-12-02 VITALS
WEIGHT: 98 LBS | HEART RATE: 88 BPM | OXYGEN SATURATION: 97 % | SYSTOLIC BLOOD PRESSURE: 95 MMHG | DIASTOLIC BLOOD PRESSURE: 70 MMHG | HEIGHT: 60 IN | BODY MASS INDEX: 19.24 KG/M2 | TEMPERATURE: 97.9 F

## 2024-12-02 PROCEDURE — 94060 EVALUATION OF WHEEZING: CPT

## 2024-12-02 PROCEDURE — ZZZZZ: CPT

## 2024-12-02 PROCEDURE — 99213 OFFICE O/P EST LOW 20 MIN: CPT | Mod: 25

## 2024-12-02 PROCEDURE — 94729 DIFFUSING CAPACITY: CPT

## 2024-12-02 PROCEDURE — 94727 GAS DIL/WSHOT DETER LNG VOL: CPT

## 2024-12-03 ENCOUNTER — RX RENEWAL (OUTPATIENT)
Age: 69
End: 2024-12-03

## 2024-12-03 RX ORDER — ATORVASTATIN CALCIUM 20 MG/1
20 TABLET, FILM COATED ORAL
Qty: 90 | Refills: 1 | Status: ACTIVE | COMMUNITY
Start: 2024-04-29 | End: 1900-01-01

## 2024-12-05 ENCOUNTER — TRANSCRIPTION ENCOUNTER (OUTPATIENT)
Age: 69
End: 2024-12-05

## 2025-01-13 ENCOUNTER — NON-APPOINTMENT (OUTPATIENT)
Age: 70
End: 2025-01-13

## 2025-01-13 ENCOUNTER — APPOINTMENT (OUTPATIENT)
Dept: HEART AND VASCULAR | Facility: CLINIC | Age: 70
End: 2025-01-13
Payer: MEDICARE

## 2025-01-13 VITALS
DIASTOLIC BLOOD PRESSURE: 65 MMHG | HEART RATE: 96 BPM | HEIGHT: 60 IN | TEMPERATURE: 98.4 F | OXYGEN SATURATION: 95 % | SYSTOLIC BLOOD PRESSURE: 98 MMHG

## 2025-01-13 PROCEDURE — 36415 COLL VENOUS BLD VENIPUNCTURE: CPT

## 2025-01-13 PROCEDURE — 93000 ELECTROCARDIOGRAM COMPLETE: CPT

## 2025-01-13 PROCEDURE — 99214 OFFICE O/P EST MOD 30 MIN: CPT

## 2025-01-14 LAB
CHOLEST SERPL-MCNC: 148 MG/DL
HDLC SERPL-MCNC: 83 MG/DL
LDLC SERPL CALC-MCNC: 51 MG/DL
NONHDLC SERPL-MCNC: 65 MG/DL
TRIGL SERPL-MCNC: 74 MG/DL

## 2025-04-23 ENCOUNTER — OUTPATIENT (OUTPATIENT)
Dept: OUTPATIENT SERVICES | Facility: HOSPITAL | Age: 70
LOS: 1 days | End: 2025-04-23
Payer: MEDICARE

## 2025-04-23 ENCOUNTER — NON-APPOINTMENT (OUTPATIENT)
Age: 70
End: 2025-04-23

## 2025-04-23 ENCOUNTER — RESULT REVIEW (OUTPATIENT)
Age: 70
End: 2025-04-23

## 2025-04-23 DIAGNOSIS — Z90.11 ACQUIRED ABSENCE OF RIGHT BREAST AND NIPPLE: Chronic | ICD-10-CM

## 2025-04-23 DIAGNOSIS — Z98.890 OTHER SPECIFIED POSTPROCEDURAL STATES: Chronic | ICD-10-CM

## 2025-04-23 DIAGNOSIS — I71.21 ANEURYSM OF THE ASCENDING AORTA, WITHOUT RUPTURE: ICD-10-CM

## 2025-04-23 DIAGNOSIS — Q21.12 PATENT FORAMEN OVALE: Chronic | ICD-10-CM

## 2025-04-23 DIAGNOSIS — Z98.890 OTHER SPECIFIED POSTPROCEDURAL STATES: ICD-10-CM

## 2025-04-23 DIAGNOSIS — I25.10 ATHEROSCLEROTIC HEART DISEASE OF NATIVE CORONARY ARTERY WITHOUT ANGINA PECTORIS: ICD-10-CM

## 2025-04-23 PROCEDURE — 93306 TTE W/DOPPLER COMPLETE: CPT | Mod: 26

## 2025-04-23 PROCEDURE — 93306 TTE W/DOPPLER COMPLETE: CPT

## 2025-05-20 ENCOUNTER — NON-APPOINTMENT (OUTPATIENT)
Age: 70
End: 2025-05-20

## 2025-07-14 ENCOUNTER — APPOINTMENT (OUTPATIENT)
Dept: HEART AND VASCULAR | Facility: CLINIC | Age: 70
End: 2025-07-14
Payer: MEDICARE

## 2025-07-14 VITALS
OXYGEN SATURATION: 95 % | SYSTOLIC BLOOD PRESSURE: 98 MMHG | TEMPERATURE: 97.6 F | WEIGHT: 97 LBS | HEIGHT: 60 IN | BODY MASS INDEX: 19.04 KG/M2 | HEART RATE: 84 BPM | DIASTOLIC BLOOD PRESSURE: 68 MMHG

## 2025-07-14 PROCEDURE — 93000 ELECTROCARDIOGRAM COMPLETE: CPT

## 2025-07-14 PROCEDURE — 99214 OFFICE O/P EST MOD 30 MIN: CPT

## 2025-07-14 PROCEDURE — G2211 COMPLEX E/M VISIT ADD ON: CPT

## 2025-07-27 ENCOUNTER — NON-APPOINTMENT (OUTPATIENT)
Age: 70
End: 2025-07-27

## 2025-08-11 ENCOUNTER — TRANSCRIPTION ENCOUNTER (OUTPATIENT)
Age: 70
End: 2025-08-11

## 2025-08-12 ENCOUNTER — TRANSCRIPTION ENCOUNTER (OUTPATIENT)
Age: 70
End: 2025-08-12

## 2025-09-09 ENCOUNTER — APPOINTMENT (OUTPATIENT)
Dept: INTERNAL MEDICINE | Facility: CLINIC | Age: 70
End: 2025-09-09
Payer: MEDICARE

## 2025-09-09 VITALS
DIASTOLIC BLOOD PRESSURE: 63 MMHG | HEART RATE: 70 BPM | WEIGHT: 95 LBS | SYSTOLIC BLOOD PRESSURE: 98 MMHG | HEIGHT: 60 IN | TEMPERATURE: 98.4 F | OXYGEN SATURATION: 97 % | BODY MASS INDEX: 18.65 KG/M2

## 2025-09-09 DIAGNOSIS — Z87.828 PERSONAL HISTORY OF OTHER (HEALED) PHYSICAL INJURY AND TRAUMA: ICD-10-CM

## 2025-09-09 DIAGNOSIS — Z80.8 FAMILY HISTORY OF MALIGNANT NEOPLASM OF OTHER ORGANS OR SYSTEMS: ICD-10-CM

## 2025-09-09 DIAGNOSIS — Z01.00 ENCOUNTER FOR EXAMINATION OF EYES AND VISION W/OUT ABNORMAL FINDINGS: ICD-10-CM

## 2025-09-09 DIAGNOSIS — Z00.00 ENCOUNTER FOR GENERAL ADULT MEDICAL EXAMINATION W/OUT ABNORMAL FINDINGS: ICD-10-CM

## 2025-09-09 PROCEDURE — G0439: CPT

## 2025-09-09 PROCEDURE — 36415 COLL VENOUS BLD VENIPUNCTURE: CPT

## 2025-09-10 ENCOUNTER — TRANSCRIPTION ENCOUNTER (OUTPATIENT)
Age: 70
End: 2025-09-10

## 2025-09-10 LAB
25(OH)D3 SERPL-MCNC: 75.2 NG/ML
ALBUMIN SERPL ELPH-MCNC: 4.3 G/DL
ALP BLD-CCNC: 73 U/L
ALT SERPL-CCNC: 35 U/L
ANION GAP SERPL CALC-SCNC: 14 MMOL/L
AST SERPL-CCNC: 33 U/L
BASOPHILS # BLD AUTO: 0.04 K/UL
BASOPHILS NFR BLD AUTO: 0.9 %
BILIRUB SERPL-MCNC: 0.4 MG/DL
BUN SERPL-MCNC: 20 MG/DL
CALCIUM SERPL-MCNC: 9.8 MG/DL
CHLORIDE SERPL-SCNC: 101 MMOL/L
CHOLEST SERPL-MCNC: 152 MG/DL
CO2 SERPL-SCNC: 24 MMOL/L
CREAT SERPL-MCNC: 0.91 MG/DL
EGFRCR SERPLBLD CKD-EPI 2021: 67 ML/MIN/1.73M2
EOSINOPHIL # BLD AUTO: 0.12 K/UL
EOSINOPHIL NFR BLD AUTO: 2.7 %
GGT SERPL-CCNC: 12 U/L
GLUCOSE SERPL-MCNC: 99 MG/DL
HCT VFR BLD CALC: 43.7 %
HDLC SERPL-MCNC: 78 MG/DL
HGB BLD-MCNC: 14.6 G/DL
IMM GRANULOCYTES NFR BLD AUTO: 0.2 %
LDLC SERPL-MCNC: 64 MG/DL
LYMPHOCYTES # BLD AUTO: 0.95 K/UL
LYMPHOCYTES NFR BLD AUTO: 21.5 %
MAGNESIUM SERPL-MCNC: 2 MG/DL
MAN DIFF?: NORMAL
MCHC RBC-ENTMCNC: 31.9 PG
MCHC RBC-ENTMCNC: 33.4 G/DL
MCV RBC AUTO: 95.6 FL
MONOCYTES # BLD AUTO: 0.59 K/UL
MONOCYTES NFR BLD AUTO: 13.4 %
NEUTROPHILS # BLD AUTO: 2.7 K/UL
NEUTROPHILS NFR BLD AUTO: 61.3 %
NONHDLC SERPL-MCNC: 74 MG/DL
PLATELET # BLD AUTO: 204 K/UL
POTASSIUM SERPL-SCNC: 4.9 MMOL/L
PROT SERPL-MCNC: 6.6 G/DL
RBC # BLD: 4.57 M/UL
RBC # FLD: 12.9 %
SODIUM SERPL-SCNC: 139 MMOL/L
TRIGL SERPL-MCNC: 45 MG/DL
TSH SERPL-ACNC: 1.3 UIU/ML
VIT B12 SERPL-MCNC: 423 PG/ML
WBC # FLD AUTO: 4.41 K/UL

## 2025-09-11 ENCOUNTER — TRANSCRIPTION ENCOUNTER (OUTPATIENT)
Age: 70
End: 2025-09-11

## 2025-09-12 LAB
APO LP(A) SERPL-MCNC: <9 NMOL/L
ESTIMATED AVERAGE GLUCOSE: 126 MG/DL
HBA1C MFR BLD HPLC: 6 %

## 2025-09-15 ENCOUNTER — TRANSCRIPTION ENCOUNTER (OUTPATIENT)
Age: 70
End: 2025-09-15

## (undated) DEVICE — SUT VICRYL 2-0 27" SH

## (undated) DEVICE — WARMING BLANKET LOWER ADULT

## (undated) DEVICE — GLV 7.5 PROTEXIS (WHITE)

## (undated) DEVICE — PACK UPPER BODY

## (undated) DEVICE — SUT MONOCRYL 4-0 18" PS-2

## (undated) DEVICE — DRSG DERMABOND 0.7ML

## (undated) DEVICE — SLV COMPRESSION KNEE MED

## (undated) DEVICE — SUT SILK 2-0 12-18"